# Patient Record
Sex: MALE | Race: WHITE | NOT HISPANIC OR LATINO | URBAN - METROPOLITAN AREA
[De-identification: names, ages, dates, MRNs, and addresses within clinical notes are randomized per-mention and may not be internally consistent; named-entity substitution may affect disease eponyms.]

---

## 2017-01-27 ENCOUNTER — OFFICE VISIT (OUTPATIENT)
Dept: INTERNAL MEDICINE | Facility: CLINIC | Age: 52
End: 2017-01-27

## 2017-01-27 ENCOUNTER — APPOINTMENT (OUTPATIENT)
Dept: LAB | Facility: HOSPITAL | Age: 52
End: 2017-01-27

## 2017-01-27 VITALS
TEMPERATURE: 97.7 F | HEART RATE: 72 BPM | DIASTOLIC BLOOD PRESSURE: 70 MMHG | OXYGEN SATURATION: 96 % | SYSTOLIC BLOOD PRESSURE: 114 MMHG | WEIGHT: 183 LBS | BODY MASS INDEX: 25.89 KG/M2 | RESPIRATION RATE: 16 BRPM

## 2017-01-27 DIAGNOSIS — K21.9 GASTROESOPHAGEAL REFLUX DISEASE WITHOUT ESOPHAGITIS: ICD-10-CM

## 2017-01-27 DIAGNOSIS — Z00.00 PREVENTATIVE HEALTH CARE: ICD-10-CM

## 2017-01-27 DIAGNOSIS — E34.9 HYPOTESTOSTERONEMIA: ICD-10-CM

## 2017-01-27 DIAGNOSIS — E55.9 VITAMIN D DEFICIENCY: ICD-10-CM

## 2017-01-27 DIAGNOSIS — I10 ESSENTIAL HYPERTENSION: ICD-10-CM

## 2017-01-27 DIAGNOSIS — E78.49 OTHER HYPERLIPIDEMIA: Primary | ICD-10-CM

## 2017-01-27 DIAGNOSIS — J44.9 CHRONIC OBSTRUCTIVE BRONCHITIS (HCC): ICD-10-CM

## 2017-01-27 DIAGNOSIS — E66.3 OVERWEIGHT: ICD-10-CM

## 2017-01-27 LAB
25(OH)D3 SERPL-MCNC: 34.7 NG/ML
ALBUMIN SERPL-MCNC: 4.6 G/DL (ref 3.2–4.8)
ALBUMIN/GLOB SERPL: 1.8 G/DL (ref 1.5–2.5)
ALP SERPL-CCNC: 63 U/L (ref 25–100)
ALT SERPL W P-5'-P-CCNC: 36 U/L (ref 7–40)
ANION GAP SERPL CALCULATED.3IONS-SCNC: -1 MMOL/L (ref 3–11)
ARTICHOKE IGE QN: 87 MG/DL (ref 0–130)
AST SERPL-CCNC: 25 U/L (ref 0–33)
BASOPHILS # BLD AUTO: 0.03 10*3/MM3 (ref 0–0.2)
BASOPHILS NFR BLD AUTO: 0.3 % (ref 0–1)
BILIRUB SERPL-MCNC: 0.7 MG/DL (ref 0.3–1.2)
BUN BLD-MCNC: 8 MG/DL (ref 9–23)
BUN/CREAT SERPL: 8.9 (ref 7–25)
CALCIUM SPEC-SCNC: 10.8 MG/DL (ref 8.7–10.4)
CHLORIDE SERPL-SCNC: 107 MMOL/L (ref 99–109)
CHOLEST SERPL-MCNC: 165 MG/DL (ref 0–200)
CO2 SERPL-SCNC: 34 MMOL/L (ref 20–31)
CREAT BLD-MCNC: 0.9 MG/DL (ref 0.6–1.3)
DEPRECATED RDW RBC AUTO: 42.6 FL (ref 37–54)
EOSINOPHIL # BLD AUTO: 0.47 10*3/MM3 (ref 0.1–0.3)
EOSINOPHIL NFR BLD AUTO: 4.3 % (ref 0–3)
ERYTHROCYTE [DISTWIDTH] IN BLOOD BY AUTOMATED COUNT: 12.1 % (ref 11.3–14.5)
GFR SERPL CREATININE-BSD FRML MDRD: 88 ML/MIN/1.73
GLOBULIN UR ELPH-MCNC: 2.5 GM/DL
GLUCOSE BLD-MCNC: 84 MG/DL (ref 70–100)
HCT VFR BLD AUTO: 43.1 % (ref 38.9–50.9)
HDLC SERPL-MCNC: 38 MG/DL (ref 40–60)
HGB BLD-MCNC: 15.7 G/DL (ref 13.1–17.5)
IMM GRANULOCYTES # BLD: 0.03 10*3/MM3 (ref 0–0.03)
IMM GRANULOCYTES NFR BLD: 0.3 % (ref 0–0.6)
LYMPHOCYTES # BLD AUTO: 3.64 10*3/MM3 (ref 0.6–4.8)
LYMPHOCYTES NFR BLD AUTO: 33 % (ref 24–44)
MCH RBC QN AUTO: 35.2 PG (ref 27–31)
MCHC RBC AUTO-ENTMCNC: 36.4 G/DL (ref 32–36)
MCV RBC AUTO: 96.6 FL (ref 80–99)
MONOCYTES # BLD AUTO: 0.77 10*3/MM3 (ref 0–1)
MONOCYTES NFR BLD AUTO: 7 % (ref 0–12)
NEUTROPHILS # BLD AUTO: 6.08 10*3/MM3 (ref 1.5–8.3)
NEUTROPHILS NFR BLD AUTO: 55.1 % (ref 41–71)
PLATELET # BLD AUTO: 280 10*3/MM3 (ref 150–450)
PMV BLD AUTO: 10.6 FL (ref 6–12)
POTASSIUM BLD-SCNC: 4.6 MMOL/L (ref 3.5–5.5)
PROT SERPL-MCNC: 7.1 G/DL (ref 5.7–8.2)
RBC # BLD AUTO: 4.46 10*6/MM3 (ref 4.2–5.76)
SODIUM BLD-SCNC: 140 MMOL/L (ref 132–146)
TRIGL SERPL-MCNC: 311 MG/DL (ref 0–150)
WBC NRBC COR # BLD: 11.02 10*3/MM3 (ref 3.5–10.8)

## 2017-01-27 PROCEDURE — 84403 ASSAY OF TOTAL TESTOSTERONE: CPT | Performed by: NURSE PRACTITIONER

## 2017-01-27 PROCEDURE — 80053 COMPREHEN METABOLIC PANEL: CPT | Performed by: NURSE PRACTITIONER

## 2017-01-27 PROCEDURE — 80061 LIPID PANEL: CPT | Performed by: NURSE PRACTITIONER

## 2017-01-27 PROCEDURE — 85025 COMPLETE CBC W/AUTO DIFF WBC: CPT | Performed by: NURSE PRACTITIONER

## 2017-01-27 PROCEDURE — 36415 COLL VENOUS BLD VENIPUNCTURE: CPT | Performed by: NURSE PRACTITIONER

## 2017-01-27 PROCEDURE — 99214 OFFICE O/P EST MOD 30 MIN: CPT | Performed by: NURSE PRACTITIONER

## 2017-01-27 PROCEDURE — 84402 ASSAY OF FREE TESTOSTERONE: CPT | Performed by: NURSE PRACTITIONER

## 2017-01-27 PROCEDURE — 82306 VITAMIN D 25 HYDROXY: CPT | Performed by: NURSE PRACTITIONER

## 2017-01-27 PROCEDURE — 90715 TDAP VACCINE 7 YRS/> IM: CPT | Performed by: NURSE PRACTITIONER

## 2017-01-27 PROCEDURE — 90471 IMMUNIZATION ADMIN: CPT | Performed by: NURSE PRACTITIONER

## 2017-01-27 RX ORDER — TOPIRAMATE 25 MG/1
25 TABLET ORAL 2 TIMES DAILY
Qty: 60 TABLET | Refills: 2
Start: 2017-01-27 | End: 2017-05-12

## 2017-01-27 NOTE — PATIENT INSTRUCTIONS
1. Resume Fish oil 1000 mg daily.    2. Stop Omeprazole. Continue Ranitidine 150 mg dily.     3. Switch Topiramate 25 mg to 1 tab before breakfast and 1 tab before supper.    4. Tdap vaccine given today.    5. Colonoscopy to be done this Spring.    6. Continue other meds.    7. Walk at least 30 minutes daily.    8. Annual fasting exam in 3 months.

## 2017-01-27 NOTE — MR AVS SNAPSHOT
Obie Marbella   1/27/2017 2:00 PM   Office Visit    Provider:  PAULO Jaramillo   Department:  [facility] INTERNAL MEDICINE   Dept Phone:  [phone]                Your Full Care Plan              Today's Medication Changes          These changes are accurate as of: 1/27/17  3:37 PM.  If you have any questions, ask your nurse or doctor.               New Medication(s)Ordered:     Tdap 5-2.5-18.5 LF-MCG/0.5 injection   Commonly known as:  BOOSTRIX   Inject 0.5 mL into the shoulder, thigh, or buttocks 1 (One) Time for 1 dose.         Medication(s)that have changed:     topiramate 25 MG tablet   Commonly known as:  TOPAMAX   Take 1 tablet by mouth 2 (Two) Times a Day. Take 1 tab before breakfast and one tab before supper   What changed:  additional instructions         Stop taking medication(s)listed here:     HYDROcodone-acetaminophen  MG per tablet   Commonly known as:  NORCO           omeprazole 20 MG capsule   Commonly known as:  priLOSEC                Where to Get Your Medications      These medications were sent to [facility] [address] [place] [address] [phone]  - [phone] [place] [address]     Phone:  [phone]     Tdap 5-2.5-18.5 LF-MCG/0.5 injection         Information about where to get these medications is not yet available     ! Ask your nurse or doctor about these medications     topiramate 25 MG tablet                  Your Updated Medication List          This list is accurate as of: 1/27/17  3:37 PM.  Always use your most recent med list.                albuterol 108 (90 BASE) MCG/ACT inhaler   Commonly known as:  PROAIR HFA   Inhale 1 puff Every 6 (Six) Hours As Needed for wheezing.       atorvastatin 40 MG tablet   Commonly known as:  LIPITOR   TAKE ONE TABLET BY MOUTH EVERY NIGHT       citalopram 40 MG tablet   Commonly known as:  CeleXA   TAKE ONE TABLET BY MOUTH ONCE  DAILY       doxazosin 2 MG tablet   Commonly known as:  CARDURA   TAKE ONE TABLET BY MOUTH ONCE DAILY       famotidine 20 MG tablet   Commonly known as:  PEPCID       fish oil 1000 MG capsule capsule   Take 1 capsule by mouth Daily With Breakfast.       gabapentin 600 MG tablet   Commonly known as:  NEURONTIN   TAKE ONE TABLET BY MOUTH IN THE EVENING AT 6PM AND ONE AT BEDTIME       lisinopril-hydrochlorothiazide 20-12.5 MG per tablet   Commonly known as:  PRINZIDE,ZESTORETIC   TAKE ONE-HALF TABLET BY MOUTH ONCE DAILY       loratadine 10 MG tablet   Commonly known as:  CLARITIN   Take 1 tablet by mouth Daily.       SUMAtriptan 100 MG tablet   Commonly known as:  IMITREX   Take 1 tablet by mouth 1 (One) Time As Needed for migraine for up to 1 dose.       Tdap 5-2.5-18.5 LF-MCG/0.5 injection   Commonly known as:  BOOSTRIX   Inject 0.5 mL into the shoulder, thigh, or buttocks 1 (One) Time for 1 dose.       topiramate 25 MG tablet   Commonly known as:  TOPAMAX   Take 1 tablet by mouth 2 (Two) Times a Day. Take 1 tab before breakfast and one tab before supper       TYLENOL 500 MG tablet   Generic drug:  acetaminophen       vitamin D3 5000 UNITS capsule capsule               We Performed the Following     CBC & Differential     Comprehensive Metabolic Panel     Lipid Panel     Testosterone, Free, Total     Vitamin D 25 Hydroxy       You Were Diagnosed With        Codes Comments    Other hyperlipidemia    -  Primary ICD-10-CM: E78.4  ICD-9-CM: 272.4     Essential hypertension     ICD-10-CM: I10  ICD-9-CM: 401.9     Chronic obstructive bronchitis     ICD-10-CM: J44.9  ICD-9-CM: 491.20     Gastroesophageal reflux disease without esophagitis     ICD-10-CM: K21.9  ICD-9-CM: 530.81     Vitamin D deficiency     ICD-10-CM: E55.9  ICD-9-CM: 268.9     Overweight     ICD-10-CM: E66.3  ICD-9-CM: 278.02     Hypotestosteronemia     ICD-10-CM: E29.1  ICD-9-CM: 257.2     Preventative health care     ICD-10-CM: Z00.00  ICD-9-CM: V70.0        Instructions    1. Resume Fish oil 1000 mg daily.    2. Stop Omeprazole. Continue Ranitidine 150 mg dily.     3. Switch Topiramate 25 mg to 1 tab before breakfast and 1 tab before supper.    4. Tdap vaccine given today.    5. Colonoscopy to be done this Spring.    6. Continue other meds.    7. Walk at least 30 minutes daily.    8. Annual fasting exam in 3 months.       Patient Instructions History      [employer] Signup     Our records indicate that you have an active [employer] account.    You can view your After Visit Summary by going to [employer] and logging in with your Social[employer]unch username and password.  If you don't have a [employer]Crunch username and password but a parent or guardian has access to your record, the parent or guardian should login with their own SocialCrunch username and password and access your record to view the After Visit Summary.    If you have questions, you can email [email or web address] or call [phone] to talk to our SocialCr[employer] staff.  Remember, SocialCrunch is NOT to be used for urgent needs.  For medical emergencies, dial 911.               Other Info from Your Visit           Allergies     Amitriptyline      Other reaction(s): Dry mouth    Bupropion  Nausea Only    Buspirone  Other (See Comments)    Iodine  Other (See Comments), Swelling    Risperidone  Other (See Comments)    Varenicline  Other (See Comments)      Reason for Visit     Hyperlipidemia           Vital Signs     Blood Pressure Pulse Temperature Respirations Weight Oxygen Saturation    114/70 (BP Location: Right arm, Patient Position: Sitting) 72 97.7 °F (36.5 °C) (Oral) 16 183 lb (83 kg) 96%    Body Mass Index Smoking Status                25.89 kg/m2 Current Every Day Smoker          Problems and Diagnoses Noted     Chronic obstructive bronchitis    Acid reflux disease    High cholesterol or triglycerides    High blood pressure    Hypotestosteronemia    Overweight    Preventative health care    Vitamin D  deficiency      No Longer an Issue     Dizziness

## 2017-01-27 NOTE — PROGRESS NOTES
Subjective   Obie Tyson is a 51 y.o. male.     History of Present Illness     1. Hyperlipidemia: Pt has several year history of elevated LDL. He is currently taking Atorvastatin 40 mg daily. LDL has been in 80's when he takes his statin consistently. He also has history of elevated TG and recurrent pancreatitis. His last TG level was 229 in October 2016. He has not been taking Fish oil.     2. Hypertension: Pt has no home BP readings. He is taking Lisinopril/HCTZ 20/12.5 mg daily and Doxazosin 2mg at bedtime mostly for weak urine stream.    3. Overweight: Pt has lost 3# in the past 3 months. He is taking Topiramate 25 mg 2 tabs every AM for headache prevention and to curb his appetite. This medication be helping somewhat, but pt continues to make poor food choices and is not interested in working with a dietitian. His current BMI is 26.     4. GERD: Pt reports no heartburn or reflux symptoms in almost 6 months. He is taking Ranitidine 150 mg daily, but does not think he has been taking Omeprazole.     5. Testosterone total level was 205 in October. This test was done per pt request. Plan to repeat total and free levels today. Pt c/o feeling tired quite a bit and is attributing his fatigue to low testosterone levels. Pt is sleeping about 4-6 hours/night, due to his work schedule.     The following portions of the patient's history were reviewed and updated as appropriate: allergies, current medications, past family history, past medical history, past social history, past surgical history and problem list.    Review of Systems   Constitutional: Positive for fatigue. Negative for fever.   HENT: Negative for congestion, ear pain, sinus pressure and sore throat.    Eyes: Negative for pain and itching.   Respiratory: Negative for cough, shortness of breath and wheezing.    Cardiovascular: Negative for chest pain, palpitations and leg swelling.   Gastrointestinal: Negative for abdominal pain and nausea.   Endocrine:  Negative for cold intolerance and heat intolerance.   Genitourinary: Negative for dysuria and hematuria.   Musculoskeletal: Positive for arthralgias (knee and elbow pain.). Negative for back pain.   Skin: Negative for rash and wound.   Allergic/Immunologic: Negative for environmental allergies and food allergies.   Neurological: Positive for headaches (chronic migraines). Negative for dizziness, seizures and syncope.   Hematological: Negative for adenopathy. Does not bruise/bleed easily.   Psychiatric/Behavioral: Negative for sleep disturbance. The patient is nervous/anxious.        Objective   Blood pressure 114/70, pulse 72, temperature 97.7 °F (36.5 °C), temperature source Oral, resp. rate 16, weight 183 lb (83 kg), SpO2 96 %.    Physical Exam   Constitutional: He is oriented to person, place, and time. He appears well-developed and well-nourished.   HENT:   Right Ear: Tympanic membrane and ear canal normal.   Left Ear: Tympanic membrane and ear canal normal.   Mouth/Throat: No posterior oropharyngeal edema or posterior oropharyngeal erythema.   Eyes: Conjunctivae and lids are normal.   Cardiovascular: Normal rate, regular rhythm and normal heart sounds.    No murmur heard.  No edema     Pulmonary/Chest: Effort normal. He has no wheezes. He has no rales.   Moderately decreased lung sounds throughout lung fields   Abdominal: Soft. Bowel sounds are normal. There is no tenderness.   Neurological: He is alert and oriented to person, place, and time.   Skin: Skin is warm and dry.   Psychiatric: He has a normal mood and affect. His behavior is normal. Thought content normal.   Vitals reviewed.    Procedures  Assessment/Plan   Obie was seen today for hyperlipidemia.    Diagnoses and all orders for this visit:    Other hyperlipidemia  -     Lipid Panel  -     Comprehensive Metabolic Panel    Essential hypertension    Chronic obstructive bronchitis  -     CBC & Differential  -     CBC Auto  Differential    Gastroesophageal reflux disease without esophagitis    Vitamin D deficiency    Overweight  -     Vitamin D 25 Hydroxy  -     topiramate (TOPAMAX) 25 MG tablet; Take 1 tablet by mouth 2 (Two) Times a Day. Take 1 tab before breakfast and one tab before supper    Hypotestosteronemia  -     Testosterone, Free, Total    Preventative health care  -     Discontinue: Tdap (BOOSTRIX) 5-2.5-18.5 LF-MCG/0.5 injection; Inject 0.5 mL into the shoulder, thigh, or buttocks 1 (One) Time for 1 dose.  -     Tdap Vaccine Greater Than or Equal To 8yo IM    1. Hyperlipidemia: Labs done today. Pt instructed to resume Fish oil daily. Plan to continue Atorvastatin 40 mg daily.    2. GERD: Pt has been asymptomatic for 6 months. Will stop Omeprazole today and continue Ranitidine 150 mg daily.     3. Overweight: Pt is mildly overweight with BMI of 26. He is to split his Topiramate dose and take one tab before breakfast and supper. Hopefully this dose will be more effective in decreasing his appetite throughout the day and continue to help ease his migraines. Goal weight in one year 170#.    4. Hypertension: Pt's BP is adequately controlled with his current meds.    5. Pt has mildly low testosterone level. Will repeat total and free levels today, then discuss possible treatment options if indicated.     6. Tdap vaccine given today.    7. Colonoscopy scheduled for this Spring.    8. Continue other meds.    Annual fasting exam due in 3 months.    Patient Instructions   1. Resume Fish oil 1000 mg daily.    2. Stop Omeprazole. Continue Ranitidine 150 mg dily.     3. Switch Topiramate 25 mg to 1 tab before breakfast and 1 tab before supper.    4. Tdap vaccine given today.    5. Colonoscopy to be done this Spring.    6. Continue other meds.    7. Walk at least 30 minutes daily.    8. Annual fasting exam in 3 months.          Electronically signed by PAULO Mukherjee 1/27/2017 4:11 PM

## 2017-01-30 ENCOUNTER — TELEPHONE (OUTPATIENT)
Dept: INTERNAL MEDICINE | Facility: CLINIC | Age: 52
End: 2017-01-30

## 2017-01-30 LAB
CONV COMMENT: ABNORMAL
TESTOST FREE SERPL-MCNC: 6.6 PG/ML (ref 7.2–24)
TESTOST SERPL-MCNC: 257 NG/DL (ref 348–1197)

## 2017-01-30 NOTE — TELEPHONE ENCOUNTER
Pt notified of labs: Vit D 34.7, FBG 84, LDL 87,  - watch diet, FBF WBC slightly elevated at 11.02 - track temp daily; may be due to recent cold or virus; testosterone levels: total 257, Free 6.6 - both borderline low. He was counseled that is fatigue or decreased exercise potential is problem may consider testosterone replacement.  Pt is to come to office tomorrow to discuss treatment options for low testosterone.

## 2017-01-31 ENCOUNTER — OFFICE VISIT (OUTPATIENT)
Dept: INTERNAL MEDICINE | Facility: CLINIC | Age: 52
End: 2017-01-31

## 2017-01-31 VITALS
BODY MASS INDEX: 26.03 KG/M2 | WEIGHT: 184 LBS | RESPIRATION RATE: 16 BRPM | DIASTOLIC BLOOD PRESSURE: 60 MMHG | OXYGEN SATURATION: 97 % | HEART RATE: 64 BPM | TEMPERATURE: 98.3 F | SYSTOLIC BLOOD PRESSURE: 114 MMHG

## 2017-01-31 DIAGNOSIS — R53.83 FATIGUE, UNSPECIFIED TYPE: ICD-10-CM

## 2017-01-31 DIAGNOSIS — E34.9 TESTOSTERONE DEFICIENCY: Primary | ICD-10-CM

## 2017-01-31 DIAGNOSIS — E34.9 HYPOTESTOSTERONEMIA: Primary | ICD-10-CM

## 2017-01-31 PROCEDURE — 99212 OFFICE O/P EST SF 10 MIN: CPT | Performed by: NURSE PRACTITIONER

## 2017-01-31 RX ORDER — TESTOSTERONE CYPIONATE 200 MG/ML
200 INJECTION, SOLUTION INTRAMUSCULAR
Qty: 10 ML | Refills: 0 | OUTPATIENT
Start: 2017-01-31 | End: 2017-08-24 | Stop reason: SDUPTHER

## 2017-01-31 NOTE — MR AVS SNAPSHOT
Obie Tyson   1/31/2017 1:30 PM   Office Visit    Provider:  PAULO Jaramillo   Department:  [facility] INTERNAL MEDICINE   Dept Phone:  [phone]                Your Full Care Plan              Your Updated Medication List          This list is accurate as of: 1/31/17  2:54 PM.  Always use your most recent med list.                albuterol 108 (90 BASE) MCG/ACT inhaler   Commonly known as:  PROAIR HFA   Inhale 1 puff Every 6 (Six) Hours As Needed for wheezing.       atorvastatin 40 MG tablet   Commonly known as:  LIPITOR   TAKE ONE TABLET BY MOUTH EVERY NIGHT       citalopram 40 MG tablet   Commonly known as:  CeleXA   TAKE ONE TABLET BY MOUTH ONCE DAILY       doxazosin 2 MG tablet   Commonly known as:  CARDURA   TAKE ONE TABLET BY MOUTH ONCE DAILY       famotidine 20 MG tablet   Commonly known as:  PEPCID       fish oil 1000 MG capsule capsule   Take 1 capsule by mouth Daily With Breakfast.       gabapentin 600 MG tablet   Commonly known as:  NEURONTIN   TAKE ONE TABLET BY MOUTH IN THE EVENING AT 6PM AND ONE AT BEDTIME       lisinopril-hydrochlorothiazide 20-12.5 MG per tablet   Commonly known as:  PRINZIDE,ZESTORETIC   TAKE ONE-HALF TABLET BY MOUTH ONCE DAILY       loratadine 10 MG tablet   Commonly known as:  CLARITIN   Take 1 tablet by mouth Daily.       SUMAtriptan 100 MG tablet   Commonly known as:  IMITREX   Take 1 tablet by mouth 1 (One) Time As Needed for migraine for up to 1 dose.       topiramate 25 MG tablet   Commonly known as:  TOPAMAX   Take 1 tablet by mouth 2 (Two) Times a Day. Take 1 tab before breakfast and one tab before supper       TYLENOL 500 MG tablet   Generic drug:  acetaminophen       vitamin D3 5000 UNITS capsule capsule               You Were Diagnosed With        Codes Comments    Hypotestosteronemia    -  Primary ICD-10-CM: E29.1  ICD-9-CM: 257.2       Instructions    1. Plan to start monthly Testosterone injections. Bring  Testosterone from pharmacy to office to begin injections when Rx filled.    2. Try to improve sleep pattern by staying awake when you arrive home and go to bed at 7:30 to sleep until time to rise for work.     3. Continue other meds.    4. Annual fasting f/u in May.       Patient Instructions History      [employer] Signup     Our records indicate that you have an active [facility] [employer] account.    You can view your After Visit Summary by going to [employer] and logging in with your e-[employer] username and password.  If you don't have a [employer] username and password but a parent or guardian has access to your record, the parent or guardian should login with their own e-Go aeroplanes username and password and access your record to view the After Visit Summary.    If you have questions, you can email [email or web address] or call [phone] to talk to our e-[employer] staff.  Remember, [employer]anes is NOT to be used for urgent needs.  For medical emergencies, dial 911.               Other Info from Your Visit           Your Appointments     May 05, 2017  2:00 PM EDT   Physical with PAULO Jaramillo   [facility] INTERNAL [facility]IC[facility] (--)    [address] [place] [address], [place]  [facility] [address] [phone]           Arrive 15 minutes prior to appointment.              Allergies     Amitriptyline      Other reaction(s): Dry mouth    Bupropion  Nausea Only    Buspirone  Other (See Comments)    Iodine  Other (See Comments), Swelling    Risperidone  Other (See Comments)    Varenicline  Other (See Comments)      Reason for Visit     Fatigue           Vital Signs     Blood Pressure Pulse Temperature Respirations Weight Oxygen Saturation    114/60 (BP Location: Right arm, Patient Position: Sitting) 64 98.3 °F (36.8 °C) (Oral) 16 184 lb (83.5 kg) 97%    Body Mass Index Smoking Status                26.03 kg/m2 Current Every Day Smoker          Problems and Diagnoses Noted      Hypotestosteronemia

## 2017-01-31 NOTE — PROGRESS NOTES
I personally interviewed the patient today concerning his testosterone deficiency.  He feels that he has more fatigue in the last year and would like to start testosterone shots.  He is also has erectile dysfunction last year that hopes to improve.  He has never tried Viagra.  I've counseled him on the routine replacement of testosterone and the assessment over the next few months.    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  He is in bed from 9:30 PM to 1:30 AM and then gets up for work.  He generally for 2 hours in the afternoon.  I've asked him to consider going to bed at 7:30 and get a total of 6 hours of sleep for better fatigue control.

## 2017-01-31 NOTE — PROGRESS NOTES
Mata Tyson is a 51 y.o. male.     History of Present Illness     Pt is here to discuss low testosterone level.  Total level was 205 in October 2016. Total was 257 and Free level was 6.6 on Jan. 27. He reports several year history of fatigue and low sexual drive. He has taken Citalopram 40 mg daily for depression and anxiety for several years. He has no history of elevated PSA; last PSA was 1.9 in March 2016. Pt's sleep cycle is broken. He arises at 2AM to get to work by 5AM; takes a nap for 2 hours in the afternoon, then goes to bed about 9PM.     The following portions of the patient's history were reviewed and updated as appropriate: allergies, current medications, past family history, past medical history, past social history, past surgical history and problem list.    Review of Systems   Constitutional: Positive for fatigue. Negative for fever.   HENT: Negative for congestion and sore throat.    Eyes: Negative for pain and itching.   Respiratory: Negative for cough and shortness of breath.    Cardiovascular: Negative for chest pain and palpitations.   Gastrointestinal: Negative for abdominal pain and nausea.   Endocrine: Negative for cold intolerance and heat intolerance.   Genitourinary: Negative for dysuria and hematuria.   Musculoskeletal: Negative for arthralgias and back pain.   Skin: Negative for rash and wound.   Allergic/Immunologic: Negative for environmental allergies and food allergies.   Neurological: Negative for dizziness and headaches.   Hematological: Negative for adenopathy. Does not bruise/bleed easily.   Psychiatric/Behavioral: Positive for sleep disturbance. The patient is nervous/anxious.        Objective   Blood pressure 114/60, pulse 64, temperature 98.3 °F (36.8 °C), temperature source Oral, resp. rate 16, weight 184 lb (83.5 kg), SpO2 97 %.    Physical Exam   Constitutional: He is oriented to person, place, and time. He appears well-developed and well-nourished.    Neurological: He is alert and oriented to person, place, and time.   Skin: Skin is warm and dry.   Psychiatric: His speech is normal and behavior is normal. His mood appears anxious (mildly).   Vitals reviewed.    Procedures  Assessment/Plan   Obie was seen today for fatigue.    Diagnoses and all orders for this visit:    Hypotestosteronemia    Fatigue, unspecified type        1. Hypotestosteronemia: Pt was counseled per Dr. Julian on benefits and risks of testosterone replacement therapy. He may benefit from monthly injections of testosterone - 200 mg/month. Will start these injections when Rx is able to be picked up. Pt will return to office monthly for injections.     2. Fatigue: Most likely multifactorial. Pt was counseled to work on sleep pattern, trying to stay awake until bedtime in the evening - may then retire about 7:30 PM to attempt to get at least 6.5-7 hours of straight sleep. Improved sleep pattern may help anxiety as well as overall fatigue.     Continue other meds.    Annual fasting exam in early May.    TGF present @ this OX.    Patient Instructions   1. Plan to start monthly Testosterone injections. Bring Testosterone from pharmacy to office to begin injections when Rx filled.    2. Try to improve sleep pattern by staying awake when you arrive home and go to bed at 7:30 to sleep until time to rise for work.     3. Continue other meds.    4. Annual fasting f/u in May.          Electronically signed by PAULO Mukherjee 1/31/2017 3:27 PM

## 2017-01-31 NOTE — PATIENT INSTRUCTIONS
1. Plan to start monthly Testosterone injections. Bring Testosterone from pharmacy to office to begin injections when Rx filled.    2. Try to improve sleep pattern by staying awake when you arrive home and go to bed at 7:30 to sleep until time to rise for work.     3. Continue other meds.    4. Annual fasting f/u in May.

## 2017-02-15 ENCOUNTER — TELEPHONE (OUTPATIENT)
Dept: INTERNAL MEDICINE | Facility: CLINIC | Age: 52
End: 2017-02-15

## 2017-03-13 RX ORDER — CITALOPRAM 40 MG/1
TABLET ORAL
Qty: 90 TABLET | Refills: 0 | Status: SHIPPED | OUTPATIENT
Start: 2017-03-13 | End: 2017-06-14 | Stop reason: SDUPTHER

## 2017-03-22 RX ORDER — CITALOPRAM 40 MG/1
TABLET ORAL
Qty: 90 TABLET | Refills: 0 | OUTPATIENT
Start: 2017-03-22

## 2017-04-03 ENCOUNTER — TELEPHONE (OUTPATIENT)
Dept: INTERNAL MEDICINE | Facility: CLINIC | Age: 52
End: 2017-04-03

## 2017-04-03 NOTE — TELEPHONE ENCOUNTER
----- Message from Pari Pa sent at 4/3/2017 11:15 AM EDT -----  Contact: MARTITA FARFAN- HIS CELEXA WAS DENIED AND NOBODY NOTIFIED PATIENT AS TO WHY. HE IS COMPLETELY OUT. [phone]      Msg left to notify pt a refill request we received on 3/21 was denied bc a refill was already sent to pharmacy on 3/13.

## 2017-04-03 NOTE — TELEPHONE ENCOUNTER
Pt was contacted re: Testosterone injections. He reports that he picked up his first dose on March 11 and his step-daughter Cate is giving them to him. He was advised that the agreement was for him to bring medication to office for monthly injections. Per TGF will agree to home injections if he calls every month on day of injection to let the office know when he got the injection. He is in agreement with this plan. Pt was asked about genetic testing request received from [facility]; which lab states pt requested on-line. Pt states he knows nothing about this testing and requests that this form be destroyed. Pt is upset that he is out of Celexa, which he requested 2 weeks ago. [facility] was contacted and pt has Rx available from refill placed on March 13, which they well get ready for him today. Pt was notified that Celexa Rx will be ready later today.

## 2017-04-13 ENCOUNTER — TELEPHONE (OUTPATIENT)
Dept: INTERNAL MEDICINE | Facility: CLINIC | Age: 52
End: 2017-04-13

## 2017-04-13 NOTE — TELEPHONE ENCOUNTER
----- Message from Jeremias Parrish sent at 4/13/2017 11:48 AM EDT -----  Regarding: TESTOSTERONE SHOT  CALLED TO TELL YOU THAT SADA GAVE HIM HIS TESTOSTERONE SHOT YESTERDAY.

## 2017-05-12 ENCOUNTER — OFFICE VISIT (OUTPATIENT)
Dept: INTERNAL MEDICINE | Facility: CLINIC | Age: 52
End: 2017-05-12

## 2017-05-12 ENCOUNTER — APPOINTMENT (OUTPATIENT)
Dept: LAB | Facility: HOSPITAL | Age: 52
End: 2017-05-12

## 2017-05-12 VITALS
BODY MASS INDEX: 24.64 KG/M2 | TEMPERATURE: 98.9 F | HEIGHT: 71 IN | WEIGHT: 176 LBS | RESPIRATION RATE: 16 BRPM | DIASTOLIC BLOOD PRESSURE: 66 MMHG | SYSTOLIC BLOOD PRESSURE: 130 MMHG | HEART RATE: 64 BPM | OXYGEN SATURATION: 99 %

## 2017-05-12 DIAGNOSIS — G43.009 MIGRAINE WITHOUT AURA AND WITHOUT STATUS MIGRAINOSUS, NOT INTRACTABLE: ICD-10-CM

## 2017-05-12 DIAGNOSIS — Z11.59 NEED FOR HEPATITIS C SCREENING TEST: ICD-10-CM

## 2017-05-12 DIAGNOSIS — E78.49 OTHER HYPERLIPIDEMIA: Primary | ICD-10-CM

## 2017-05-12 DIAGNOSIS — Z12.5 SCREENING FOR PROSTATE CANCER: ICD-10-CM

## 2017-05-12 DIAGNOSIS — I10 ESSENTIAL HYPERTENSION: ICD-10-CM

## 2017-05-12 DIAGNOSIS — F41.9 ANXIETY: ICD-10-CM

## 2017-05-12 DIAGNOSIS — E34.9 HYPOTESTOSTERONEMIA: ICD-10-CM

## 2017-05-12 DIAGNOSIS — E55.9 VITAMIN D DEFICIENCY: ICD-10-CM

## 2017-05-12 DIAGNOSIS — J44.9 CHRONIC OBSTRUCTIVE BRONCHITIS (HCC): ICD-10-CM

## 2017-05-12 LAB
25(OH)D3 SERPL-MCNC: 22.7 NG/ML
ALBUMIN SERPL-MCNC: 4.2 G/DL (ref 3.2–4.8)
ALBUMIN/GLOB SERPL: 2 G/DL (ref 1.5–2.5)
ALP SERPL-CCNC: 78 U/L (ref 25–100)
ALT SERPL W P-5'-P-CCNC: 27 U/L (ref 7–40)
ANION GAP SERPL CALCULATED.3IONS-SCNC: 4 MMOL/L (ref 3–11)
ARTICHOKE IGE QN: 84 MG/DL (ref 0–130)
AST SERPL-CCNC: 21 U/L (ref 0–33)
BACTERIA UR QL AUTO: NORMAL /HPF
BASOPHILS # BLD AUTO: 0.03 10*3/MM3 (ref 0–0.2)
BASOPHILS NFR BLD AUTO: 0.4 % (ref 0–1)
BILIRUB SERPL-MCNC: 0.2 MG/DL (ref 0.3–1.2)
BILIRUB UR QL STRIP: NEGATIVE
BUN BLD-MCNC: 10 MG/DL (ref 9–23)
BUN/CREAT SERPL: 12.5 (ref 7–25)
CALCIUM SPEC-SCNC: 10.2 MG/DL (ref 8.7–10.4)
CHLORIDE SERPL-SCNC: 108 MMOL/L (ref 99–109)
CHOLEST SERPL-MCNC: 151 MG/DL (ref 0–200)
CLARITY UR: CLEAR
CO2 SERPL-SCNC: 29 MMOL/L (ref 20–31)
COLOR UR: YELLOW
CREAT BLD-MCNC: 0.8 MG/DL (ref 0.6–1.3)
DEPRECATED RDW RBC AUTO: 40.2 FL (ref 37–54)
EOSINOPHIL # BLD AUTO: 0.36 10*3/MM3 (ref 0.1–0.3)
EOSINOPHIL NFR BLD AUTO: 4.7 % (ref 0–3)
ERYTHROCYTE [DISTWIDTH] IN BLOOD BY AUTOMATED COUNT: 11.6 % (ref 11.3–14.5)
GFR SERPL CREATININE-BSD FRML MDRD: 101 ML/MIN/1.73
GLOBULIN UR ELPH-MCNC: 2.1 GM/DL
GLUCOSE BLD-MCNC: 94 MG/DL (ref 70–100)
GLUCOSE UR STRIP-MCNC: NEGATIVE MG/DL
HCT VFR BLD AUTO: 40.8 % (ref 38.9–50.9)
HCV AB SER DONR QL: NORMAL
HDLC SERPL-MCNC: 39 MG/DL (ref 40–60)
HGB BLD-MCNC: 14.5 G/DL (ref 13.1–17.5)
HGB UR QL STRIP.AUTO: NEGATIVE
HYALINE CASTS UR QL AUTO: NORMAL /LPF
IMM GRANULOCYTES # BLD: 0.02 10*3/MM3 (ref 0–0.03)
IMM GRANULOCYTES NFR BLD: 0.3 % (ref 0–0.6)
KETONES UR QL STRIP: NEGATIVE
LEUKOCYTE ESTERASE UR QL STRIP.AUTO: NEGATIVE
LYMPHOCYTES # BLD AUTO: 2.86 10*3/MM3 (ref 0.6–4.8)
LYMPHOCYTES NFR BLD AUTO: 37 % (ref 24–44)
MCH RBC QN AUTO: 33.6 PG (ref 27–31)
MCHC RBC AUTO-ENTMCNC: 35.5 G/DL (ref 32–36)
MCV RBC AUTO: 94.7 FL (ref 80–99)
MONOCYTES # BLD AUTO: 0.52 10*3/MM3 (ref 0–1)
MONOCYTES NFR BLD AUTO: 6.7 % (ref 0–12)
NEUTROPHILS # BLD AUTO: 3.95 10*3/MM3 (ref 1.5–8.3)
NEUTROPHILS NFR BLD AUTO: 50.9 % (ref 41–71)
NITRITE UR QL STRIP: NEGATIVE
PH UR STRIP.AUTO: 6 [PH] (ref 5–8)
PLATELET # BLD AUTO: 246 10*3/MM3 (ref 150–450)
PMV BLD AUTO: 11 FL (ref 6–12)
POTASSIUM BLD-SCNC: 4.4 MMOL/L (ref 3.5–5.5)
PROT SERPL-MCNC: 6.3 G/DL (ref 5.7–8.2)
PROT UR QL STRIP: NEGATIVE
PSA SERPL-MCNC: 2.15 NG/ML (ref 0–4)
RBC # BLD AUTO: 4.31 10*6/MM3 (ref 4.2–5.76)
RBC # UR: NORMAL /HPF
REF LAB TEST METHOD: NORMAL
SODIUM BLD-SCNC: 141 MMOL/L (ref 132–146)
SP GR UR STRIP: 1.01 (ref 1–1.03)
SQUAMOUS #/AREA URNS HPF: NORMAL /HPF
TESTOST SERPL-MCNC: 225.72 NG/DL (ref 86.98–780.1)
TRIGL SERPL-MCNC: 242 MG/DL (ref 0–150)
UROBILINOGEN UR QL STRIP: NORMAL
WBC NRBC COR # BLD: 7.74 10*3/MM3 (ref 3.5–10.8)
WBC UR QL AUTO: NORMAL /HPF

## 2017-05-12 PROCEDURE — 93000 ELECTROCARDIOGRAM COMPLETE: CPT | Performed by: INTERNAL MEDICINE

## 2017-05-12 PROCEDURE — 36415 COLL VENOUS BLD VENIPUNCTURE: CPT | Performed by: NURSE PRACTITIONER

## 2017-05-12 PROCEDURE — 99406 BEHAV CHNG SMOKING 3-10 MIN: CPT | Performed by: NURSE PRACTITIONER

## 2017-05-12 PROCEDURE — 84403 ASSAY OF TOTAL TESTOSTERONE: CPT | Performed by: NURSE PRACTITIONER

## 2017-05-12 PROCEDURE — 80061 LIPID PANEL: CPT | Performed by: NURSE PRACTITIONER

## 2017-05-12 PROCEDURE — 99215 OFFICE O/P EST HI 40 MIN: CPT | Performed by: NURSE PRACTITIONER

## 2017-05-12 PROCEDURE — 86803 HEPATITIS C AB TEST: CPT | Performed by: NURSE PRACTITIONER

## 2017-05-12 PROCEDURE — 84153 ASSAY OF PSA TOTAL: CPT | Performed by: NURSE PRACTITIONER

## 2017-05-12 PROCEDURE — 81001 URINALYSIS AUTO W/SCOPE: CPT | Performed by: NURSE PRACTITIONER

## 2017-05-12 PROCEDURE — 85025 COMPLETE CBC W/AUTO DIFF WBC: CPT | Performed by: NURSE PRACTITIONER

## 2017-05-12 PROCEDURE — 80053 COMPREHEN METABOLIC PANEL: CPT | Performed by: NURSE PRACTITIONER

## 2017-05-12 PROCEDURE — 82306 VITAMIN D 25 HYDROXY: CPT | Performed by: NURSE PRACTITIONER

## 2017-05-12 RX ORDER — RISPERIDONE 0.25 MG/1
0.25 TABLET ORAL DAILY
Qty: 30 TABLET | Refills: 0 | Status: SHIPPED | OUTPATIENT
Start: 2017-05-12 | End: 2018-01-11

## 2017-05-12 RX ORDER — ACETAMINOPHEN, ASPIRIN AND CAFFEINE 250; 250; 65 MG/1; MG/1; MG/1
1 TABLET, FILM COATED ORAL 2 TIMES DAILY PRN
Refills: 0
Start: 2017-05-12 | End: 2017-09-07

## 2017-05-15 ENCOUNTER — TELEPHONE (OUTPATIENT)
Dept: INTERNAL MEDICINE | Facility: CLINIC | Age: 52
End: 2017-05-15

## 2017-05-15 DIAGNOSIS — E55.9 VITAMIN D DEFICIENCY: Primary | ICD-10-CM

## 2017-05-15 RX ORDER — CHOLECALCIFEROL (VITAMIN D3) 125 MCG
1 CAPSULE ORAL DAILY
Qty: 30 TABLET
Start: 2017-05-15 | End: 2021-11-15

## 2017-06-14 RX ORDER — DOXAZOSIN 2 MG/1
TABLET ORAL
Qty: 90 TABLET | Refills: 1 | Status: SHIPPED | OUTPATIENT
Start: 2017-06-14 | End: 2021-11-15

## 2017-06-14 RX ORDER — ATORVASTATIN CALCIUM 40 MG/1
TABLET, FILM COATED ORAL
Qty: 90 TABLET | Refills: 1 | Status: SHIPPED | OUTPATIENT
Start: 2017-06-14 | End: 2017-12-14 | Stop reason: SDUPTHER

## 2017-06-14 RX ORDER — LISINOPRIL AND HYDROCHLOROTHIAZIDE 20; 12.5 MG/1; MG/1
TABLET ORAL
Qty: 45 TABLET | Refills: 1 | Status: SHIPPED | OUTPATIENT
Start: 2017-06-14 | End: 2018-01-11 | Stop reason: SDUPTHER

## 2017-06-14 RX ORDER — CITALOPRAM 40 MG/1
TABLET ORAL
Qty: 90 TABLET | Refills: 1 | Status: SHIPPED | OUTPATIENT
Start: 2017-06-14 | End: 2017-12-14 | Stop reason: SDUPTHER

## 2017-08-22 RX ORDER — TESTOSTERONE CYPIONATE 200 MG/ML
INJECTION, SOLUTION INTRAMUSCULAR
Refills: 9 | OUTPATIENT
Start: 2017-08-22

## 2017-08-24 RX ORDER — TESTOSTERONE CYPIONATE 200 MG/ML
200 INJECTION, SOLUTION INTRAMUSCULAR
Qty: 3 ML | Refills: 0 | OUTPATIENT
Start: 2017-08-24 | End: 2017-11-28 | Stop reason: SDUPTHER

## 2017-09-07 ENCOUNTER — OFFICE VISIT (OUTPATIENT)
Dept: INTERNAL MEDICINE | Facility: CLINIC | Age: 52
End: 2017-09-07

## 2017-09-07 ENCOUNTER — APPOINTMENT (OUTPATIENT)
Dept: LAB | Facility: HOSPITAL | Age: 52
End: 2017-09-07

## 2017-09-07 VITALS
HEART RATE: 72 BPM | OXYGEN SATURATION: 97 % | TEMPERATURE: 99.4 F | RESPIRATION RATE: 16 BRPM | WEIGHT: 173 LBS | SYSTOLIC BLOOD PRESSURE: 114 MMHG | BODY MASS INDEX: 24.13 KG/M2 | DIASTOLIC BLOOD PRESSURE: 60 MMHG

## 2017-09-07 DIAGNOSIS — K21.9 GASTROESOPHAGEAL REFLUX DISEASE WITHOUT ESOPHAGITIS: ICD-10-CM

## 2017-09-07 DIAGNOSIS — J44.9 CHRONIC OBSTRUCTIVE BRONCHITIS (HCC): ICD-10-CM

## 2017-09-07 DIAGNOSIS — E55.9 VITAMIN D DEFICIENCY: ICD-10-CM

## 2017-09-07 DIAGNOSIS — E34.9 HYPOTESTOSTERONEMIA: ICD-10-CM

## 2017-09-07 DIAGNOSIS — F41.9 ANXIETY: ICD-10-CM

## 2017-09-07 DIAGNOSIS — I10 ESSENTIAL HYPERTENSION: ICD-10-CM

## 2017-09-07 DIAGNOSIS — E78.49 OTHER HYPERLIPIDEMIA: Primary | ICD-10-CM

## 2017-09-07 PROBLEM — Z11.59 NEED FOR HEPATITIS C SCREENING TEST: Status: RESOLVED | Noted: 2017-05-12 | Resolved: 2017-09-07

## 2017-09-07 LAB
ALBUMIN SERPL-MCNC: 4.4 G/DL (ref 3.2–4.8)
ALBUMIN/GLOB SERPL: 2 G/DL (ref 1.5–2.5)
ALP SERPL-CCNC: 83 U/L (ref 25–100)
ALT SERPL W P-5'-P-CCNC: 24 U/L (ref 7–40)
ANION GAP SERPL CALCULATED.3IONS-SCNC: 4 MMOL/L (ref 3–11)
ARTICHOKE IGE QN: 77 MG/DL (ref 0–130)
AST SERPL-CCNC: 19 U/L (ref 0–33)
BILIRUB SERPL-MCNC: 0.3 MG/DL (ref 0.3–1.2)
BUN BLD-MCNC: 8 MG/DL (ref 9–23)
BUN/CREAT SERPL: 10 (ref 7–25)
CALCIUM SPEC-SCNC: 10 MG/DL (ref 8.7–10.4)
CHLORIDE SERPL-SCNC: 110 MMOL/L (ref 99–109)
CHOLEST SERPL-MCNC: 146 MG/DL (ref 0–200)
CO2 SERPL-SCNC: 26 MMOL/L (ref 20–31)
CREAT BLD-MCNC: 0.8 MG/DL (ref 0.6–1.3)
GFR SERPL CREATININE-BSD FRML MDRD: 101 ML/MIN/1.73
GLOBULIN UR ELPH-MCNC: 2.2 GM/DL
GLUCOSE BLD-MCNC: 93 MG/DL (ref 70–100)
HDLC SERPL-MCNC: 37 MG/DL (ref 40–60)
POTASSIUM BLD-SCNC: 4.1 MMOL/L (ref 3.5–5.5)
PROT SERPL-MCNC: 6.6 G/DL (ref 5.7–8.2)
SODIUM BLD-SCNC: 140 MMOL/L (ref 132–146)
TESTOST SERPL-MCNC: 374.64 NG/DL (ref 86.98–780.1)
TRIGL SERPL-MCNC: 257 MG/DL (ref 0–150)

## 2017-09-07 PROCEDURE — 80061 LIPID PANEL: CPT | Performed by: INTERNAL MEDICINE

## 2017-09-07 PROCEDURE — 99213 OFFICE O/P EST LOW 20 MIN: CPT | Performed by: INTERNAL MEDICINE

## 2017-09-07 PROCEDURE — 84403 ASSAY OF TOTAL TESTOSTERONE: CPT | Performed by: INTERNAL MEDICINE

## 2017-09-07 PROCEDURE — 80053 COMPREHEN METABOLIC PANEL: CPT | Performed by: INTERNAL MEDICINE

## 2017-09-07 PROCEDURE — 36415 COLL VENOUS BLD VENIPUNCTURE: CPT | Performed by: INTERNAL MEDICINE

## 2017-09-07 NOTE — PROGRESS NOTES
Mata Tyson is a 52 y.o. male.     History of Present Illness     The patient continues to moderate manual labor at age 52.  He feels his back fatigue late in the day and he's had more right hip pain this summer.  He requires to gabapentin in the evening to relieve his back discomfort and sleep adequately.  He is not using analgesic pain medication except for Tylenol.  He has had multiple joint aches over the last few years based on the physical activity the predominate's for several weeks.    The following portions of the patient's history were reviewed and updated as appropriate: allergies, current medications, past family history, past medical history, past social history, past surgical history and problem list.    Review of Systems   Constitutional: Negative for appetite change and fatigue.   Respiratory: Positive for shortness of breath. Negative for wheezing.    Cardiovascular: Negative for chest pain and palpitations.   Gastrointestinal: Negative for abdominal distention and nausea.   Neurological: Positive for headaches. Negative for dizziness and light-headedness.       Objective   Blood pressure 114/60, pulse 72, temperature 99.4 °F (37.4 °C), temperature source Oral, resp. rate 16, weight 173 lb (78.5 kg), SpO2 97 %.    Physical Exam   Constitutional: He is oriented to person, place, and time. He appears well-developed and well-nourished. No distress.   Cardiovascular: Normal rate, regular rhythm and normal heart sounds.    Pulmonary/Chest: Effort normal. He has no wheezes. He has no rales.   Sounds generally tight and depressed   Musculoskeletal: Normal range of motion. He exhibits no edema.   I'll paralumbar tightness tenderness.  Hips full flexion extension rotation with minimal tenderness.   Neurological: He is alert and oriented to person, place, and time. He exhibits normal muscle tone. Coordination normal.   Psychiatric: He has a normal mood and affect. His behavior is normal.  Judgment and thought content normal.   Nursing note and vitals reviewed.    Procedures  Assessment/Plan   Obie was seen today for hip pain.    Diagnoses and all orders for this visit:    Other hyperlipidemia  -     Comprehensive Metabolic Panel  -     Lipid Panel    Essential hypertension    Chronic obstructive bronchitis    Gastroesophageal reflux disease without esophagitis    Anxiety    Hypotestosteronemia  -     Testosterone    Vitamin D deficiency    The patient has lumbar fatigue and spondylosis with referred pain to his right hip.  I've cautioned her to be careful about excessive standing and manual labor.    The patient has severe chronic anxiety and is doing well on citalopram.  He still has episodes of concerning about talking to himself too much.  He has failed risperidone this summer which caused excess of hangover.  He has failed multiple other medications.  Psychological counseling may be his best option.    The patient has moderate COPD from chronic tobacco use.  He has excessive tightness and points of dyspnea on exertion.  I've offered him a short course of Spiriva to test its effectiveness.    The patient is done well with testosterone replacement now for several years.  He wishes to go up on his dose so he feels stronger and more capable.  I suspect that higher doses of testosterone will only lead to side effects.    Patient Instructions   1.  Use Spiriva 1 whiff daily - to improve breathing.    2.  Do back exercises 5 minutes every morning - build back strength.    3.  Stop all tobacco - use electronic cigarette as needed.    4.  The nurse will call with test results.    5.  Return visit January - fasting checkup and preventive exam.    6.  Testosterone level adequate at 374  - May continue current testosterone replacement.    7.  LDL cholesterol excellent at 77.  Continue atorvastatin 40 mg.    8.  Chemistry panel is acceptable requires no change in treatment.    Electronically signed Earl  TEMI Julian M.D.9/9/2017 2:28 PM

## 2017-09-07 NOTE — PATIENT INSTRUCTIONS
1.  Use Spiriva 1 whiff daily - to improve breathing.    2.  Do back exercises 5 minutes every morning - build back strength.    3.  Stop all tobacco - use electronic cigarette as needed.    4.  The nurse will call with test results.    5.  Return visit January - fasting checkup and preventive exam.

## 2017-09-09 PROBLEM — M47.816 LUMBAR SPONDYLOSIS: Status: ACTIVE | Noted: 2017-09-09

## 2017-09-12 ENCOUNTER — TELEPHONE (OUTPATIENT)
Dept: INTERNAL MEDICINE | Facility: CLINIC | Age: 52
End: 2017-09-12

## 2017-09-12 NOTE — TELEPHONE ENCOUNTER
Called pt re: recent labs.  Per TGF -  Testosterone level adequate at 374  - May continue current testosterone replacement.  LDL cholesterol excellent at 77.  Continue atorvastatin 40 mg.  Chemistry panel is acceptable requires no change in treatment.  Pt verb understanding.

## 2017-09-26 ENCOUNTER — TELEPHONE (OUTPATIENT)
Dept: INTERNAL MEDICINE | Facility: CLINIC | Age: 52
End: 2017-09-26

## 2017-09-26 NOTE — TELEPHONE ENCOUNTER
----- Message from Pari Pa sent at 9/26/2017  9:59 AM EDT -----  Contact: MARTITA  PATIENT RIGHT HIP STILL HURTING. HE SPOKE TO TGF ABOUT THIS AT LAST VISIT AND TGF RECOMMENDED EXERCISES. PATIENT HAS BEEN WORKING ON THOSE BUT SEES NO IMPROVEMENT. HE WANTS A SCRIPT FOR MUSCLE RELAXER CALLED INTO [facility] [place]      Pt said he takes gabapentin 600 mg qevening & at bedtime and ibuprofen & tylenol but not helping.

## 2017-09-26 NOTE — TELEPHONE ENCOUNTER
Msg left for pt - per TGF can not order anything stronger over the phone, would need ox with hip xray upon arrival.

## 2017-09-27 ENCOUNTER — TELEPHONE (OUTPATIENT)
Dept: INTERNAL MEDICINE | Facility: CLINIC | Age: 52
End: 2017-09-27

## 2017-09-27 RX ORDER — GABAPENTIN 600 MG/1
TABLET ORAL
Qty: 90 TABLET | Refills: 0
Start: 2017-09-27 | End: 2017-12-29 | Stop reason: SDUPTHER

## 2017-09-27 RX ORDER — TRAMADOL HYDROCHLORIDE 50 MG/1
50 TABLET ORAL 2 TIMES DAILY PRN
Qty: 10 TABLET | Refills: 0 | OUTPATIENT
Start: 2017-09-27 | End: 2017-12-01

## 2017-09-27 RX ORDER — SENNOSIDES 8.6 MG
650 CAPSULE ORAL 2 TIMES DAILY
Start: 2017-09-27

## 2017-09-27 NOTE — TELEPHONE ENCOUNTER
Per TGF, I called pt re rt hip pain.  Notified will order tramadol bid prn #10. He needs to get on tylenol 650mg bid. Take additional gabapentin 300mg bid (am & pm) along with usual 600mg qevening & at bedtime. Pt to call next wk w/status. OX in 1 mo. Pt verb understanding.

## 2017-11-29 RX ORDER — TESTOSTERONE CYPIONATE 200 MG/ML
INJECTION, SOLUTION INTRAMUSCULAR
Qty: 3 ML | Refills: 0 | OUTPATIENT
Start: 2017-11-29 | End: 2018-01-11 | Stop reason: SDUPTHER

## 2017-12-01 ENCOUNTER — OFFICE VISIT (OUTPATIENT)
Dept: INTERNAL MEDICINE | Facility: CLINIC | Age: 52
End: 2017-12-01

## 2017-12-01 VITALS
DIASTOLIC BLOOD PRESSURE: 60 MMHG | SYSTOLIC BLOOD PRESSURE: 140 MMHG | TEMPERATURE: 99.4 F | BODY MASS INDEX: 23.99 KG/M2 | WEIGHT: 172 LBS | OXYGEN SATURATION: 96 % | RESPIRATION RATE: 16 BRPM | HEART RATE: 92 BPM

## 2017-12-01 DIAGNOSIS — F33.41 RECURRENT MAJOR DEPRESSIVE DISORDER, IN PARTIAL REMISSION (HCC): ICD-10-CM

## 2017-12-01 DIAGNOSIS — G25.81 RESTLESS LEGS SYNDROME: ICD-10-CM

## 2017-12-01 DIAGNOSIS — F41.9 ANXIETY: Primary | ICD-10-CM

## 2017-12-01 PROCEDURE — 99213 OFFICE O/P EST LOW 20 MIN: CPT | Performed by: INTERNAL MEDICINE

## 2017-12-01 RX ORDER — RISPERIDONE 0.5 MG/1
0.5 TABLET ORAL NIGHTLY
Qty: 30 TABLET | Refills: 5 | Status: SHIPPED | OUTPATIENT
Start: 2017-12-01 | End: 2018-01-11

## 2017-12-01 RX ORDER — ALPRAZOLAM 0.5 MG/1
0.5 TABLET ORAL DAILY PRN
Qty: 5 TABLET | Refills: 0 | OUTPATIENT
Start: 2017-12-01 | End: 2017-12-08 | Stop reason: SDUPTHER

## 2017-12-01 NOTE — PROGRESS NOTES
Mata Tyson is a 52 y.o. male.     History of Present Illness     The patient has many years of moderate chronic anxiety and clinical depression.  He stresses increased so much in the last year that he noticed he was talking to himself to relieve stress.  He has had no hallucinations or delusions.  He is been on citalopram in recent years and had been on desipramine up to the last year.  He works a stressful job as a  and generally only ECG from 9 PM to 2 AM because of his work schedule.  He has not been able to go to work earlier.  He has noticed a new severe anxiety with his wife and is considering a marital separation.    The following portions of the patient's history were reviewed and updated as appropriate: allergies, current medications, past family history, past medical history, past social history, past surgical history and problem list.    Review of Systems   Constitutional: Negative for appetite change and fatigue.   HENT: Negative for ear pain and sore throat.    Respiratory: Negative for cough and shortness of breath.    Cardiovascular: Negative for chest pain and palpitations.   Gastrointestinal: Negative for abdominal pain and nausea.   Musculoskeletal: Negative for arthralgias and back pain.   Neurological: Positive for headaches. Negative for dizziness.   Psychiatric/Behavioral: Positive for agitation, dysphoric mood and sleep disturbance. Negative for confusion and self-injury. The patient is nervous/anxious.        Objective   Blood pressure 140/60, pulse 92, temperature 99.4 °F (37.4 °C), temperature source Oral, resp. rate 16, weight 172 lb (78 kg), SpO2 96 %.    Physical Exam   Constitutional: He is oriented to person, place, and time. He appears well-developed and well-nourished. No distress.   Musculoskeletal: Normal range of motion. He exhibits no edema or tenderness.   Neurological: He is alert and oriented to person, place, and time. He exhibits normal muscle  tone. Coordination normal.   Psychiatric: His behavior is normal. Judgment and thought content normal.   Jacobo mildly agitated   Nursing note and vitals reviewed.    Procedures  Assessment/Plan   Obie was seen today for anxiety.    Diagnoses and all orders for this visit:    Anxiety  -     Ambulatory Referral to Psychology    Recurrent major depressive disorder, in partial remission  -     Ambulatory Referral to Psychology    Restless legs syndrome    Other orders  -     risperiDONE (risperDAL) 0.5 MG tablet; Take 1 tablet by mouth Every Night. At 8 PM  -     ALPRAZolam (XANAX) 0.5 MG tablet; Take 1 tablet by mouth Daily As Needed for Anxiety.    The patient seems to have significant insight in his degree of anxiety and his need to control his anger.  I've asked him to increase his risperidone at this time and to seek psychological counseling.  In fact his anxiety, depression, and other behavior would likely benefit from psychiatric evaluation.    The patient has recurring severe depression.  He may benefit from changing his medication perhaps to Cymbalta or adding a second agent such as imipramine.     The patient's had a significant restless leg syndrome.  He is receiving significant benefit from gabapentin.    Patient Instructions   1.  Change risperidone 0.25 mg - at 3:00 in the afternoon.    2.  Start risperidone 0.5 mg - at 8 PM in the evening.    3.  Counseling sessions with clinical psychologist - to improve handling stress.    4.  Try to increase sleep - from 5 hours nightly to 6 hours - nightly.    5.  Return visit January 11 - fasting checkup.    6.  Consider referral to psychiatrist for second opinion with medications.    Electronically signed Earl Julian M.D.12/3/2017 5:54 PM

## 2017-12-01 NOTE — PATIENT INSTRUCTIONS
1.  Change risperidone 0.25 mg - at 3:00 in the afternoon.    2.  Start risperidone 0.5 mg - at 8 PM in the evening.    3.  Counseling sessions with clinical psychologist - to improve handling stress.    4.  Try to increase sleep - from 5 hours nightly to 6 hours - nightly.    5.  Return visit January 11 - fasting checkup.

## 2017-12-08 ENCOUNTER — TELEPHONE (OUTPATIENT)
Dept: INTERNAL MEDICINE | Facility: CLINIC | Age: 52
End: 2017-12-08

## 2017-12-08 RX ORDER — SILDENAFIL CITRATE 20 MG/1
TABLET ORAL
Qty: 30 TABLET | Refills: 0 | OUTPATIENT
Start: 2017-12-08 | End: 2021-11-15

## 2017-12-08 RX ORDER — ALPRAZOLAM 0.5 MG/1
0.5 TABLET ORAL DAILY PRN
Qty: 30 TABLET | Refills: 0 | OUTPATIENT
Start: 2017-12-08 | End: 2018-01-11

## 2017-12-08 NOTE — TELEPHONE ENCOUNTER
----- Message from Pari Pa sent at 12/8/2017 10:14 AM EST -----  Contact: MARTITA [phone]  TOLD TO CALL WITH STATUS UPDATE, ALPRAZOLAM IS HELPING. HE WOULD LIKE A RENEWAL OF ALPRAZOLAM ALONG WITH A SCRIPT FOR ALEXIA SENT TO [facility] [place]

## 2017-12-08 NOTE — TELEPHONE ENCOUNTER
Pt requesting daily cialis as he's seen advertised on tv.   Per TGF, pt is on cardura, and taking cialis daily with it can cause hypotension. Can order prn sildenafil instead, which is cheaper than cialis. Pt agreeable to prn sildenafil. RX called in to [facility] per TGF.   Pt instrxed to monitor bp, and hold cardura the day before using sildenafil d/t hypotensive risk if taken together. Pt verb understanding.

## 2017-12-14 RX ORDER — ATORVASTATIN CALCIUM 40 MG/1
TABLET, FILM COATED ORAL
Qty: 90 TABLET | Refills: 1 | Status: SHIPPED | OUTPATIENT
Start: 2017-12-14 | End: 2018-04-02 | Stop reason: SDUPTHER

## 2017-12-14 RX ORDER — CITALOPRAM 40 MG/1
TABLET ORAL
Qty: 90 TABLET | Refills: 1 | Status: SHIPPED | OUTPATIENT
Start: 2017-12-14 | End: 2018-03-29

## 2017-12-29 ENCOUNTER — TELEPHONE (OUTPATIENT)
Dept: INTERNAL MEDICINE | Facility: CLINIC | Age: 52
End: 2017-12-29

## 2017-12-29 RX ORDER — GABAPENTIN 600 MG/1
600 TABLET ORAL
Qty: 180 TABLET | Refills: 1 | OUTPATIENT
Start: 2017-12-29 | End: 2021-11-15

## 2018-01-08 ENCOUNTER — OFFICE VISIT (OUTPATIENT)
Dept: RETAIL CLINIC | Facility: CLINIC | Age: 53
End: 2018-01-08

## 2018-01-08 VITALS
HEART RATE: 70 BPM | DIASTOLIC BLOOD PRESSURE: 72 MMHG | OXYGEN SATURATION: 96 % | BODY MASS INDEX: 32.12 KG/M2 | TEMPERATURE: 97.8 F | HEIGHT: 60 IN | RESPIRATION RATE: 16 BRPM | WEIGHT: 163.6 LBS | SYSTOLIC BLOOD PRESSURE: 146 MMHG

## 2018-01-08 DIAGNOSIS — J01.40 ACUTE NON-RECURRENT PANSINUSITIS: ICD-10-CM

## 2018-01-08 DIAGNOSIS — R68.89 FLU-LIKE SYMPTOMS: Primary | ICD-10-CM

## 2018-01-08 LAB
EXPIRATION DATE: NORMAL
FLUAV AG NPH QL: NEGATIVE
FLUBV AG NPH QL: NEGATIVE
INTERNAL CONTROL: NORMAL
Lab: NORMAL

## 2018-01-08 PROCEDURE — 99213 OFFICE O/P EST LOW 20 MIN: CPT | Performed by: NURSE PRACTITIONER

## 2018-01-08 PROCEDURE — 87804 INFLUENZA ASSAY W/OPTIC: CPT | Performed by: NURSE PRACTITIONER

## 2018-01-08 RX ORDER — BROMPHENIRAMINE MALEATE, PSEUDOEPHEDRINE HYDROCHLORIDE, AND DEXTROMETHORPHAN HYDROBROMIDE 2; 30; 10 MG/5ML; MG/5ML; MG/5ML
5 SYRUP ORAL 4 TIMES DAILY PRN
Qty: 240 ML | Refills: 0 | Status: SHIPPED | OUTPATIENT
Start: 2018-01-08 | End: 2018-01-13

## 2018-01-08 RX ORDER — DOXYCYCLINE HYCLATE 100 MG/1
100 CAPSULE ORAL 2 TIMES DAILY
Qty: 20 CAPSULE | Refills: 0 | Status: SHIPPED | OUTPATIENT
Start: 2018-01-08 | End: 2018-01-15

## 2018-01-08 RX ORDER — FLUTICASONE PROPIONATE 50 MCG
2 SPRAY, SUSPENSION (ML) NASAL NIGHTLY
Qty: 1 BOTTLE | Refills: 0 | Status: SHIPPED | OUTPATIENT
Start: 2018-01-08 | End: 2018-02-07

## 2018-01-08 NOTE — PROGRESS NOTES
Mata Tyson is a 52 y.o. male.     Flu Symptoms   This is a new problem. The current episode started in the past 7 days (5 days). The problem occurs constantly. The problem has been gradually worsening. Associated symptoms include arthralgias, chills, congestion (green ), coughing, diaphoresis, fatigue, a fever (has felt feverish but did not take temp at home), headaches, myalgias, swollen glands and weakness. Pertinent negatives include no abdominal pain (diarrhea x1), anorexia, change in bowel habit, chest pain, joint swelling, nausea, neck pain, numbness, rash, sore throat, urinary symptoms, vertigo, visual change or vomiting. Nothing aggravates the symptoms. Treatments tried: nyquil and day quil. The treatment provided no relief.        Current Outpatient Prescriptions on File Prior to Visit   Medication Sig Dispense Refill   • acetaminophen (TYLENOL ARTHRITIS PAIN) 650 MG 8 hr tablet Take 1 tablet by mouth 2 (Two) Times a Day.     • albuterol (PROAIR HFA) 108 (90 BASE) MCG/ACT inhaler Inhale 1 puff Every 6 (Six) Hours As Needed for wheezing. 1 inhaler 1   • ALPRAZolam (XANAX) 0.5 MG tablet Take 1 tablet by mouth Daily As Needed for Anxiety. 30 tablet 0   • atorvastatin (LIPITOR) 40 MG tablet TAKE ONE TABLET BY MOUTH IN THE EVENING 90 tablet 1   • Cholecalciferol (VITAMIN D3) 2000 UNITS tablet Take 1 tablet by mouth Daily. 30 tablet    • citalopram (CeleXA) 40 MG tablet TAKE ONE TABLET BY MOUTH ONCE DAILY 90 tablet 1   • doxazosin (CARDURA) 2 MG tablet TAKE ONE TABLET BY MOUTH ONCE DAILY 90 tablet 1   • famotidine (PEPCID) 20 MG tablet Take  by mouth.     • gabapentin (NEURONTIN) 600 MG tablet Take 1 tablet by mouth 2 (Two) Times a Day. 180 tablet 1   • lisinopril-hydrochlorothiazide (PRINZIDE,ZESTORETIC) 20-12.5 MG per tablet TAKE ONE-HALF TABLET BY MOUTH ONCE DAILY 45 tablet 1   • loratadine (CLARITIN) 10 MG tablet Take 1 tablet by mouth Daily.     • risperiDONE (RISPERDAL) 0.25 MG tablet Take 1  tablet by mouth Daily. 30 tablet 0   • risperiDONE (risperDAL) 0.5 MG tablet Take 1 tablet by mouth Every Night. At 8 PM 30 tablet 5   • sildenafil (REVATIO) 20 MG tablet TAKE 2-3 TABLETS AS NEEDED 30 tablet 0   • Testosterone Cypionate (DEPOTESTOTERONE CYPIONATE) 200 MG/ML injection INJECT ONE ML (CC) INTRAMUSCULARLY ONCE EVERY MONTH 3 mL 0   • [DISCONTINUED] SUMAtriptan (IMITREX) 100 MG tablet Take 1 tablet by mouth 1 (One) Time As Needed for migraine for up to 1 dose. 10 tablet 0     No current facility-administered medications on file prior to visit.        Allergies   Allergen Reactions   • Iodine Swelling and Other (See Comments)     Respiratory distress     • Amitriptyline      Dry mouth   • Bupropion Nausea Only   • Buspirone      No help with anxiety       Past Medical History:   Diagnosis Date   • Acute pancreatitis     10 times - in feb 2015;  [facility]; July 2016   • Aortic valve insufficiency    • Bursitis of right knee 2016   • Chronic anxiety     2017 severe anger intensity while on citalopram and risperidone   • COPD (chronic obstructive pulmonary disease)    • Depression    • Fracture of ankle 2008    left surgical repair   • GERD (gastroesophageal reflux disease)    • Hyperkalemia    • Hyperlipidemia    • Hypertension    • Low back pain    • Macular degeneration 2017    bilateral   • Migraine    • Perforated ulcer 2013   • Restless leg syndrome    • Substance abuse     history of drug and alcohol abuse; last alcohol use May 31, 2011   • Tendonitis of elbow, right    • Tension headache    • TMJ (dislocation of temporomandibular joint)    • Tobacco use    • Vitamin D deficiency        Past Surgical History:   Procedure Laterality Date   • APPENDECTOMY     • CHOLECYSTECTOMY     • HERNIA REPAIR  2014    ventral   • TEMPOROMANDIBULAR JOINT ARTHROPLASTY         Family History   Problem Relation Age of Onset   • Heart attack Mother      stents   • Heart disease Mother      pacemaker   • Macular  degeneration Mother    • Arthritis Mother    • Heart disease Father      CHF   • COPD Father    • Heart failure Father    • Asthma Father    • Fibromyalgia Sister    • Cancer Paternal Uncle      oral   • Heart disease Paternal Uncle      stents; CABG; non-smoker   • Bipolar disorder Other    • Depression Other    • Asthma Other    • COPD Other    • Arthritis Brother        Social History     Social History   • Marital status:      Spouse name: N/A   • Number of children: N/A   • Years of education: N/A     Occupational History   •       Social History Main Topics   • Smoking status: Current Every Day Smoker     Packs/day: 1.00     Years: 20.00     Types: Cigarettes     Start date: 1997   • Smokeless tobacco: Never Used      Comment: HIstory of 2 ppd for 20 years. Decreased to 4 cigarettes daily Spring of 2017.    • Alcohol use No   • Drug use: No   • Sexual activity: Not on file     Other Topics Concern   • Not on file     Social History Narrative    Domestic life: Lives in private home with wife - 12/2017 may be  from wife        Catholic: [employer]        Sleep hygiene:   9 PM to 2 AM due to work schedule         Caffeine use: 1 cup coffee and 4-5 Mountain Dews daily        Exercise habits: Walking at work - has increased this year        Diet: Well-balanced        Occupation:          Vision: Corrected with glasses; early bilateral macular degeneration        Hearing: No Impairment        Driving: No limitations       Review of Systems   Constitutional: Positive for activity change, appetite change, chills, diaphoresis, fatigue and fever (has felt feverish but did not take temp at home).   HENT: Positive for congestion (green ), sinus pain, sinus pressure, sneezing and voice change. Negative for sore throat and trouble swallowing.    Respiratory: Positive for cough.    Cardiovascular: Negative for chest pain.   Gastrointestinal: Negative for abdominal pain (diarrhea x1),  "anorexia, change in bowel habit, diarrhea, nausea and vomiting.   Musculoskeletal: Positive for arthralgias and myalgias. Negative for joint swelling and neck pain.   Skin: Negative for rash.   Allergic/Immunologic: Negative for environmental allergies.   Neurological: Positive for weakness and headaches. Negative for vertigo and numbness.       /72 (BP Location: Left arm, Patient Position: Sitting, Cuff Size: Adult)  Pulse 70  Temp 97.8 °F (36.6 °C) (Oral)   Resp 16  Ht 71 cm (27.95\")  Wt 74.2 kg (163 lb 9.6 oz)  SpO2 96%  .21 kg/m2    Objective   Physical Exam   Constitutional: He is oriented to person, place, and time. He appears well-developed and well-nourished. He is cooperative. He appears ill.   HENT:   Head: Normocephalic and atraumatic.   Right Ear: External ear and ear canal normal. A middle ear effusion is present.   Left Ear: External ear and ear canal normal. Tympanic membrane is injected.   Nose: Mucosal edema and rhinorrhea present. Right sinus exhibits maxillary sinus tenderness and frontal sinus tenderness. Left sinus exhibits maxillary sinus tenderness and frontal sinus tenderness.   Mouth/Throat: Uvula is midline and mucous membranes are normal. Oropharyngeal exudate (clear) and posterior oropharyngeal erythema present. No posterior oropharyngeal edema.   Eyes: Conjunctivae and lids are normal.   Cardiovascular: Normal heart sounds.    Pulmonary/Chest: Effort normal and breath sounds normal.   Lymphadenopathy:     He has cervical adenopathy.   Neurological: He is alert and oriented to person, place, and time.   Skin: Skin is warm and dry. No rash noted.   Psychiatric: He has a normal mood and affect. His speech is normal and behavior is normal.         Assessment/Plan   Obie was seen today for flu symptoms.    Diagnoses and all orders for this visit:    Flu-like symptoms  -     POCT Influenza A/B  -     brompheniramine-pseudoephedrine-DM 30-2-10 MG/5ML syrup; Take 5 mL by " mouth 4 (Four) Times a Day As Needed for Cough for up to 5 days.  -     fluticasone (FLONASE) 50 MCG/ACT nasal spray; 2 sprays into each nostril Every Night for 30 days. Administer 2 sprays in each nostril for each dose.  -     doxycycline (VIBRAMYCIN) 100 MG capsule; Take 1 capsule by mouth 2 (Two) Times a Day for 7 days.    Acute non-recurrent pansinusitis  -     brompheniramine-pseudoephedrine-DM 30-2-10 MG/5ML syrup; Take 5 mL by mouth 4 (Four) Times a Day As Needed for Cough for up to 5 days.  -     fluticasone (FLONASE) 50 MCG/ACT nasal spray; 2 sprays into each nostril Every Night for 30 days. Administer 2 sprays in each nostril for each dose.  -     doxycycline (VIBRAMYCIN) 100 MG capsule; Take 1 capsule by mouth 2 (Two) Times a Day for 7 days.        Results for orders placed or performed in visit on 01/08/18   POCT Influenza A/B   Result Value Ref Range    Rapid Influenza A Ag negative     Rapid Influenza B Ag negative     Internal Control Passed Passed    Lot Number [id number]     Expiration Date 4/2019        Follow up with PCP or go to the nearest emergency room if symptoms worsen or fail to improve.

## 2018-01-08 NOTE — PATIENT INSTRUCTIONS
Sinusitis, Adult  Sinusitis is soreness and inflammation of your sinuses. Sinuses are hollow spaces in the bones around your face. Your sinuses are located:  · Around your eyes.  · In the middle of your forehead.  · Behind your nose.  · In your cheekbones.  Your sinuses and nasal passages are lined with a stringy fluid (mucus). Mucus normally drains out of your sinuses. When your nasal tissues become inflamed or swollen, the mucus can become trapped or blocked so air cannot flow through your sinuses. This allows bacteria, viruses, and funguses to grow, which leads to infection.  Sinusitis can develop quickly and last for 7-10 days (acute) or for more than 12 weeks (chronic). Sinusitis often develops after a cold.  CAUSES  This condition is caused by anything that creates swelling in the sinuses or stops mucus from draining, including:  · Allergies.  · Asthma.  · Bacterial or viral infection.  · Abnormally shaped bones between the nasal passages.  · Nasal growths that contain mucus (nasal polyps).  · Narrow sinus openings.  · Pollutants, such as chemicals or irritants in the air.  · A foreign object stuck in the nose.  · A fungal infection. This is rare.  RISK FACTORS  The following factors may make you more likely to develop this condition:  · Having allergies or asthma.  · Having had a recent cold or respiratory tract infection.  · Having structural deformities or blockages in your nose or sinuses.  · Having a weak immune system.  · Doing a lot of swimming or diving.  · Overusing nasal sprays.  · Smoking.  SYMPTOMS  The main symptoms of this condition are pain and a feeling of pressure around the affected sinuses. Other symptoms include:  · Upper toothache.  · Earache.  · Headache.  · Bad breath.  · Decreased sense of smell and taste.  · A cough that may get worse at night.  · Fatigue.  · Fever.  · Thick drainage from your nose. The drainage is often green and it may contain pus (purulent).  · Stuffy nose or  congestion.  · Postnasal drip. This is when extra mucus collects in the throat or back of the nose.  · Swelling and warmth over the affected sinuses.  · Sore throat.  · Sensitivity to light.  DIAGNOSIS  This condition is diagnosed based on symptoms, a medical history, and a physical exam. To find out if your condition is acute or chronic, your health care provider may:  · Look in your nose for signs of nasal polyps.  · Tap over the affected sinus to check for signs of infection.  · View the inside of your sinuses using an imaging device that has a light attached (endoscope).  If your health care provider suspects that you have chronic sinusitis, you may also:  · Be tested for allergies.  · Have a sample of mucus taken from your nose (nasal culture) and checked for bacteria.  · Have a mucus sample examined to see if your sinusitis is related to an allergy.  If your sinusitis does not respond to treatment and it lasts longer than 8 weeks, you may have an MRI or CT scan to check your sinuses. These scans also help to determine how severe your infection is.  In rare cases, a bone biopsy may be done to rule out more serious types of fungal sinus disease.  TREATMENT  Treatment for sinusitis depends on the cause and whether your condition is chronic or acute. If a virus is causing your sinusitis, your symptoms will go away on their own within 10 days. You may be given medicines to relieve your symptoms, including:  · Topical nasal decongestants. They shrink swollen nasal passages and let mucus drain from your sinuses.  · Antihistamines. These drugs block inflammation that is triggered by allergies. This can help to ease swelling in your nose and sinuses.  · Topical nasal corticosteroids. These are nasal sprays that ease inflammation and swelling in your nose and sinuses.  · Nasal saline washes. These rinses can help to get rid of thick mucus in your nose.  If your condition is caused by bacteria, you will be given an  antibiotic medicine. If your condition is caused by a fungus, you will be given an antifungal medicine.  Surgery may be needed to correct underlying conditions, such as narrow nasal passages. Surgery may also be needed to remove polyps.  HOME CARE INSTRUCTIONS  Medicines  · Take, use, or apply over-the-counter and prescription medicines only as told by your health care provider. These may include nasal sprays.  · If you were prescribed an antibiotic medicine, take it as told by your health care provider. Do not stop taking the antibiotic even if you start to feel better.  Hydrate and Humidify  · Drink enough water to keep your urine clear or pale yellow. Staying hydrated will help to thin your mucus.  · Use a cool mist humidifier to keep the humidity level in your home above 50%.  · Inhale steam for 10-15 minutes, 3-4 times a day or as told by your health care provider. You can do this in the bathroom while a hot shower is running.  · Limit your exposure to cool or dry air.  Rest  · Rest as much as possible.  · Sleep with your head raised (elevated).  · Make sure to get enough sleep each night.  General Instructions  · Apply a warm, moist washcloth to your face 3-4 times a day or as told by your health care provider. This will help with discomfort.  · Wash your hands often with soap and water to reduce your exposure to viruses and other germs. If soap and water are not available, use hand .  · Do not smoke. Avoid being around people who are smoking (secondhand smoke).  · Keep all follow-up visits as told by your health care provider. This is important.  SEEK MEDICAL CARE IF:  · You have a fever.  · Your symptoms get worse.  · Your symptoms do not improve within 10 days.  SEEK IMMEDIATE MEDICAL CARE IF:  · You have a severe headache.  · You have persistent vomiting.  · You have pain or swelling around your face or eyes.  · You have vision problems.  · You develop confusion.  · Your neck is stiff.  · You have  trouble breathing.     This information is not intended to replace advice given to you by your health care provider. Make sure you discuss any questions you have with your health care provider.     Document Released: 12/18/2006 Document Revised: 04/10/2017 Document Reviewed: 10/12/2016  [employer] Interactive Patient Education ©2017 [employer]  Sinus Rinse  WHAT IS A SINUS RINSE?  A sinus rinse is a simple home treatment that is used to rinse your sinuses with a sterile mixture of salt and water (saline solution). Sinuses are air-filled spaces in your skull behind the bones of your face and forehead that open into your nasal cavity.  You will use the following:  · Saline solution.  · Neti pot or spray bottle. This releases the saline solution into your nose and through your sinuses. Neti pots and spray bottles can be purchased at your local pharmacy, a [employer] store, or online.  WHEN WOULD I DO A SINUS RINSE?  A sinus rinse can help to clear mucus, dirt, dust, or pollen from the nasal cavity. You may do a sinus rinse when you have a cold, a virus, nasal allergy symptoms, a sinus infection, or stuffiness in the nose or sinuses.  If you are considering a sinus rinse:  · Ask your child's health care provider before performing a sinus rinse on your child.  · Do not do a sinus rinse if you have had ear or nasal surgery, ear infection, or blocked ears.  HOW DO I DO A SINUS RINSE?  · Wash your hands.  · Disinfect your device according to the directions provided and then dry it.  · Use the solution that comes with your device or one that is sold separately in stores. Follow the mixing directions on the package.  · Fill your device with the amount of saline solution as directed by the device instructions.  · Stand over a sink and tilt your head sideways over the sink.  · Place the spout of the device in your upper nostril (the one closer to the ceiling).  · Gently pour or squeeze the saline solution into the nasal  cavity. The liquid should drain to the lower nostril if you are not overly congested.  · Gently blow your nose. Blowing too hard may cause ear pain.  · Repeat in the other nostril.  · Clean and rinse your device with clean water and then air-dry it.  ARE THERE RISKS OF A SINUS RINSE?   Sinus rinse is generally very safe and effective. However, there are a few risks, which include:   · A burning sensation in the sinuses. This may happen if you do not make the saline solution as directed. Make sure to follow all directions when making the saline solution.  · Infection from contaminated water. This is rare, but possible.  · Nasal irritation.     This information is not intended to replace advice given to you by your health care provider. Make sure you discuss any questions you have with your health care provider.     Document Released: 07/15/2015 Document Reviewed: 07/15/2015  [employer] Interactive Patient Education ©2017 [employer]

## 2018-01-10 RX ORDER — LISINOPRIL AND HYDROCHLOROTHIAZIDE 20; 12.5 MG/1; MG/1
TABLET ORAL
Qty: 45 TABLET | Refills: 1 | Status: CANCELLED | OUTPATIENT
Start: 2018-01-10

## 2018-01-10 RX ORDER — ALPRAZOLAM 0.5 MG/1
TABLET ORAL
Qty: 30 TABLET | Refills: 0 | Status: CANCELLED | OUTPATIENT
Start: 2018-01-10

## 2018-01-11 ENCOUNTER — APPOINTMENT (OUTPATIENT)
Dept: LAB | Facility: HOSPITAL | Age: 53
End: 2018-01-11

## 2018-01-11 ENCOUNTER — OFFICE VISIT (OUTPATIENT)
Dept: INTERNAL MEDICINE | Facility: CLINIC | Age: 53
End: 2018-01-11

## 2018-01-11 VITALS
BODY MASS INDEX: 148.47 KG/M2 | OXYGEN SATURATION: 97 % | DIASTOLIC BLOOD PRESSURE: 60 MMHG | HEART RATE: 64 BPM | SYSTOLIC BLOOD PRESSURE: 110 MMHG | TEMPERATURE: 97.6 F | WEIGHT: 165 LBS | RESPIRATION RATE: 16 BRPM

## 2018-01-11 DIAGNOSIS — J01.90 ACUTE NON-RECURRENT SINUSITIS, UNSPECIFIED LOCATION: ICD-10-CM

## 2018-01-11 DIAGNOSIS — F33.41 RECURRENT MAJOR DEPRESSIVE DISORDER, IN PARTIAL REMISSION (HCC): ICD-10-CM

## 2018-01-11 DIAGNOSIS — I10 ESSENTIAL HYPERTENSION: ICD-10-CM

## 2018-01-11 DIAGNOSIS — K21.9 GASTROESOPHAGEAL REFLUX DISEASE WITHOUT ESOPHAGITIS: ICD-10-CM

## 2018-01-11 DIAGNOSIS — F41.9 ANXIETY: ICD-10-CM

## 2018-01-11 DIAGNOSIS — E78.49 OTHER HYPERLIPIDEMIA: Primary | ICD-10-CM

## 2018-01-11 LAB
ALBUMIN SERPL-MCNC: 4.2 G/DL (ref 3.2–4.8)
ALBUMIN/GLOB SERPL: 1.8 G/DL (ref 1.5–2.5)
ALP SERPL-CCNC: 62 U/L (ref 25–100)
ALT SERPL W P-5'-P-CCNC: 16 U/L (ref 7–40)
ANION GAP SERPL CALCULATED.3IONS-SCNC: 5 MMOL/L (ref 3–11)
ARTICHOKE IGE QN: 112 MG/DL (ref 0–130)
AST SERPL-CCNC: 12 U/L (ref 0–33)
BILIRUB SERPL-MCNC: 0.2 MG/DL (ref 0.3–1.2)
BUN BLD-MCNC: 20 MG/DL (ref 9–23)
BUN/CREAT SERPL: 22.2 (ref 7–25)
CALCIUM SPEC-SCNC: 10.3 MG/DL (ref 8.7–10.4)
CHLORIDE SERPL-SCNC: 104 MMOL/L (ref 99–109)
CHOLEST SERPL-MCNC: 162 MG/DL (ref 0–200)
CO2 SERPL-SCNC: 30 MMOL/L (ref 20–31)
CREAT BLD-MCNC: 0.9 MG/DL (ref 0.6–1.3)
GFR SERPL CREATININE-BSD FRML MDRD: 88 ML/MIN/1.73
GLOBULIN UR ELPH-MCNC: 2.3 GM/DL
GLUCOSE BLD-MCNC: 91 MG/DL (ref 70–100)
HDLC SERPL-MCNC: 42 MG/DL (ref 40–60)
POTASSIUM BLD-SCNC: 4.5 MMOL/L (ref 3.5–5.5)
PROT SERPL-MCNC: 6.5 G/DL (ref 5.7–8.2)
SODIUM BLD-SCNC: 139 MMOL/L (ref 132–146)
TRIGL SERPL-MCNC: 240 MG/DL (ref 0–150)

## 2018-01-11 PROCEDURE — 99214 OFFICE O/P EST MOD 30 MIN: CPT | Performed by: INTERNAL MEDICINE

## 2018-01-11 PROCEDURE — 80053 COMPREHEN METABOLIC PANEL: CPT | Performed by: INTERNAL MEDICINE

## 2018-01-11 PROCEDURE — 80061 LIPID PANEL: CPT | Performed by: INTERNAL MEDICINE

## 2018-01-11 PROCEDURE — 36415 COLL VENOUS BLD VENIPUNCTURE: CPT | Performed by: INTERNAL MEDICINE

## 2018-01-11 RX ORDER — RISPERIDONE 0.5 MG/1
0.5 TABLET ORAL NIGHTLY
Qty: 90 TABLET | Refills: 3 | Status: SHIPPED | OUTPATIENT
Start: 2018-01-11 | End: 2021-11-15

## 2018-01-11 RX ORDER — ALPRAZOLAM 0.5 MG/1
0.5 TABLET ORAL DAILY PRN
Qty: 30 TABLET | Refills: 1 | OUTPATIENT
Start: 2018-01-11 | End: 2018-03-09 | Stop reason: SDUPTHER

## 2018-01-11 RX ORDER — TESTOSTERONE CYPIONATE 200 MG/ML
200 INJECTION, SOLUTION INTRAMUSCULAR
Qty: 3 ML | Refills: 0 | OUTPATIENT
Start: 2018-01-11 | End: 2018-04-02 | Stop reason: SDUPTHER

## 2018-01-11 RX ORDER — LISINOPRIL AND HYDROCHLOROTHIAZIDE 20; 12.5 MG/1; MG/1
0.5 TABLET ORAL DAILY
Qty: 45 TABLET | Refills: 1 | Status: SHIPPED | OUTPATIENT
Start: 2018-01-11 | End: 2021-11-15

## 2018-01-11 RX ORDER — RISPERIDONE 0.25 MG/1
0.25 TABLET ORAL DAILY
Qty: 90 TABLET | Refills: 3 | Status: SHIPPED | OUTPATIENT
Start: 2018-01-11 | End: 2021-11-15

## 2018-01-11 NOTE — PATIENT INSTRUCTIONS
1.  Continue usual medicines and supplements - as listed.    2.  Use ibuprofen - only once or twice weekly as needed - for severe pain.    3.  Finish doxycycline - to clear sinus infection.    4.  The nurse will call with test results.    5.  Return visit 2 months - fasting checkup.

## 2018-01-11 NOTE — PROGRESS NOTES
Mata Tyson is a 52 y.o. male.     History of Present Illness     The patient has many years of severe chronic anxiety and clinical depression.  He has a prior history of substance abuse including alcohol and tobacco.  He is been off of alcohol and street drugs now for many years.  He has done well on citalopram but developed a severe degree of anger last fall.  He has increased his risperidone and feels that he is less tense and angry.  He does occasionally use low-dose alprazolam at peak events.    The following portions of the patient's history were reviewed and updated as appropriate: allergies, current medications, past family history, past medical history, past social history, past surgical history and problem list.    Review of Systems   Constitutional: Negative for appetite change and fatigue.   HENT: Positive for congestion and sinus pain. Negative for ear pain and sore throat.    Respiratory: Positive for cough and shortness of breath.    Cardiovascular: Negative for chest pain and palpitations.   Gastrointestinal: Negative for abdominal pain and nausea.   Musculoskeletal: Negative for arthralgias and back pain.   Neurological: Negative for dizziness and headaches.   Psychiatric/Behavioral: Positive for sleep disturbance. Negative for dysphoric mood. The patient is nervous/anxious.        Objective   Blood pressure 110/60, pulse 64, temperature 97.6 °F (36.4 °C), temperature source Oral, resp. rate 16, weight 74.8 kg (165 lb), SpO2 97 %.    Physical Exam   Constitutional: He is oriented to person, place, and time. He appears well-developed and well-nourished.   mild general distress   HENT:   Right Ear: External ear normal.   Left Ear: External ear normal.   Moderate edema erythema oropharynx   Neck: Normal range of motion. Neck supple. No JVD present.   Cardiovascular: Normal rate, regular rhythm and normal heart sounds.    Pulmonary/Chest: Effort normal and breath sounds normal. He has no  wheezes. He has no rales.   Lymphadenopathy:     He has no cervical adenopathy.   Neurological: He is alert and oriented to person, place, and time. He exhibits normal muscle tone. Coordination normal.   Psychiatric: His behavior is normal. Judgment normal.   Moderate anxiety with low mood   Nursing note and vitals reviewed.    Procedures  Assessment/Plan   Obie was seen today for anxiety.    Diagnoses and all orders for this visit:    Other hyperlipidemia  -     Comprehensive Metabolic Panel  -     Lipid Panel    Essential hypertension    Gastroesophageal reflux disease without esophagitis    Anxiety  -     risperiDONE (RISPERDAL) 0.25 MG tablet; Take 1 tablet by mouth Daily.    Recurrent major depressive disorder, in partial remission    Acute non-recurrent sinusitis, unspecified location    Other orders  -     risperiDONE (risperDAL) 0.5 MG tablet; Take 1 tablet by mouth Every Night. At 8 PM  -     ALPRAZolam (XANAX) 0.5 MG tablet; Take 1 tablet by mouth Daily As Needed for Anxiety.  -     lisinopril-hydrochlorothiazide (PRINZIDE,ZESTORETIC) 20-12.5 MG per tablet; Take 0.5 tablets by mouth Daily.  -     Testosterone Cypionate (DEPOTESTOTERONE CYPIONATE) 200 MG/ML injection; Inject 1 mL into the shoulder, thigh, or buttocks Every 28 (Twenty-Eight) Days.      The patient developed severe increase anxiety with anger management difficulties.  He has expressed difficulty with marital discord in recent months.  He has had severe conflicts with people at work but currently is on good terms with his employer and works regularly.  I've asked him to stay with citalopram and risperidone on a regular basis.  As needed Xanax may be especially helpful if not used daily.    Patient has moderately severe hyperlipidemia and high risk for cardiovascular disease.  His LDL cholesterol had been 70 or 80 last year.  His LDL cholesterol now at 112 suggested he may be missing pills.    The patient had a recent sinusitis and mitral  walk-in clinic January 8.  He is improving now on doxycycline and should finish his course of antibiotics.    Patient Instructions   1.  Continue usual medicines and supplements - as listed.    2.  Use ibuprofen - only once or twice weekly as needed - for severe pain.    3.  Finish doxycycline - to clear sinus infection.    4.  The nurse will call with test results.    5.  Return visit 2 months - fasting checkup.    6.  LDL cholesterol more elevated  at 112.  Be consistent with pravastatin daily.    7.  Chemistry panel is acceptable.    Electronically signed Earl Julian M.D.1/14/2018 12:41 PM

## 2018-01-15 ENCOUNTER — TELEPHONE (OUTPATIENT)
Dept: INTERNAL MEDICINE | Facility: CLINIC | Age: 53
End: 2018-01-15

## 2018-01-15 NOTE — TELEPHONE ENCOUNTER
Called labs to pt.  Per TGF:  LDL more elevated at 112.  Be consistent with pravastatin daily.  Chemistry panel is acceptable.  Pt states he missed some pravastatin when he ran out while moving.  He has it now and won't miss more doses.

## 2018-02-09 ENCOUNTER — TELEPHONE (OUTPATIENT)
Dept: INTERNAL MEDICINE | Facility: CLINIC | Age: 53
End: 2018-02-09

## 2018-02-09 NOTE — TELEPHONE ENCOUNTER
Pt notified a 30 day fill (#30) w 1 RF [facility] so should have a refill there. Pt verb understanding.

## 2018-03-09 RX ORDER — ALPRAZOLAM 0.5 MG/1
TABLET ORAL
Qty: 30 TABLET | Refills: 1 | OUTPATIENT
Start: 2018-03-09 | End: 2018-05-09 | Stop reason: SDUPTHER

## 2018-03-29 ENCOUNTER — OFFICE VISIT (OUTPATIENT)
Dept: INTERNAL MEDICINE | Facility: CLINIC | Age: 53
End: 2018-03-29

## 2018-03-29 ENCOUNTER — APPOINTMENT (OUTPATIENT)
Dept: LAB | Facility: HOSPITAL | Age: 53
End: 2018-03-29

## 2018-03-29 VITALS
SYSTOLIC BLOOD PRESSURE: 106 MMHG | OXYGEN SATURATION: 97 % | HEART RATE: 68 BPM | WEIGHT: 160 LBS | BODY MASS INDEX: 143.97 KG/M2 | DIASTOLIC BLOOD PRESSURE: 60 MMHG | TEMPERATURE: 98.3 F | RESPIRATION RATE: 16 BRPM

## 2018-03-29 DIAGNOSIS — N42.9 DISORDER OF PROSTATE: ICD-10-CM

## 2018-03-29 DIAGNOSIS — E34.9 TESTOSTERONE DEFICIENCY: ICD-10-CM

## 2018-03-29 DIAGNOSIS — R41.840 ATTENTION DISTURBANCE: ICD-10-CM

## 2018-03-29 DIAGNOSIS — R53.83 FATIGUE, UNSPECIFIED TYPE: ICD-10-CM

## 2018-03-29 DIAGNOSIS — E78.49 OTHER HYPERLIPIDEMIA: ICD-10-CM

## 2018-03-29 DIAGNOSIS — I10 ESSENTIAL HYPERTENSION: Primary | ICD-10-CM

## 2018-03-29 DIAGNOSIS — J44.9 CHRONIC OBSTRUCTIVE BRONCHITIS (HCC): ICD-10-CM

## 2018-03-29 PROBLEM — J01.90 ACUTE SINUSITIS: Status: RESOLVED | Noted: 2018-01-11 | Resolved: 2018-03-29

## 2018-03-29 LAB
ALBUMIN SERPL-MCNC: 4.5 G/DL (ref 3.2–4.8)
ALBUMIN/GLOB SERPL: 2 G/DL (ref 1.5–2.5)
ALP SERPL-CCNC: 75 U/L (ref 25–100)
ALT SERPL W P-5'-P-CCNC: 26 U/L (ref 7–40)
ANION GAP SERPL CALCULATED.3IONS-SCNC: 7 MMOL/L (ref 3–11)
ARTICHOKE IGE QN: 110 MG/DL (ref 0–130)
AST SERPL-CCNC: 20 U/L (ref 0–33)
BILIRUB SERPL-MCNC: 0.3 MG/DL (ref 0.3–1.2)
BUN BLD-MCNC: 13 MG/DL (ref 9–23)
BUN/CREAT SERPL: 14.4 (ref 7–25)
CALCIUM SPEC-SCNC: 10.4 MG/DL (ref 8.7–10.4)
CHLORIDE SERPL-SCNC: 104 MMOL/L (ref 99–109)
CHOLEST SERPL-MCNC: 181 MG/DL (ref 0–200)
CO2 SERPL-SCNC: 29 MMOL/L (ref 20–31)
CREAT BLD-MCNC: 0.9 MG/DL (ref 0.6–1.3)
GFR SERPL CREATININE-BSD FRML MDRD: 88 ML/MIN/1.73
GLOBULIN UR ELPH-MCNC: 2.3 GM/DL
GLUCOSE BLD-MCNC: 97 MG/DL (ref 70–100)
HDLC SERPL-MCNC: 52 MG/DL (ref 40–60)
POTASSIUM BLD-SCNC: 4.4 MMOL/L (ref 3.5–5.5)
PROT SERPL-MCNC: 6.8 G/DL (ref 5.7–8.2)
PSA SERPL-MCNC: 1.78 NG/ML (ref 0–4)
SODIUM BLD-SCNC: 140 MMOL/L (ref 132–146)
TESTOST SERPL-MCNC: 396.54 NG/DL (ref 86.98–780.1)
TRIGL SERPL-MCNC: 239 MG/DL (ref 0–150)

## 2018-03-29 PROCEDURE — 36415 COLL VENOUS BLD VENIPUNCTURE: CPT | Performed by: INTERNAL MEDICINE

## 2018-03-29 PROCEDURE — G0103 PSA SCREENING: HCPCS | Performed by: INTERNAL MEDICINE

## 2018-03-29 PROCEDURE — 99213 OFFICE O/P EST LOW 20 MIN: CPT | Performed by: INTERNAL MEDICINE

## 2018-03-29 PROCEDURE — 80061 LIPID PANEL: CPT | Performed by: INTERNAL MEDICINE

## 2018-03-29 PROCEDURE — 80053 COMPREHEN METABOLIC PANEL: CPT | Performed by: INTERNAL MEDICINE

## 2018-03-29 PROCEDURE — 84403 ASSAY OF TOTAL TESTOSTERONE: CPT | Performed by: INTERNAL MEDICINE

## 2018-03-29 RX ORDER — ESCITALOPRAM OXALATE 20 MG/1
20 TABLET ORAL DAILY
Qty: 90 TABLET | Refills: 3 | Status: SHIPPED | OUTPATIENT
Start: 2018-03-29 | End: 2021-11-15

## 2018-03-29 NOTE — PROGRESS NOTES
Mata Tyson is a 52 y.o. male.     History of Present Illness     The patient's had an adult history of severe anxiety, grade with depression and agitation.  He has used drugs and alcohol in past years.  He has stopped all alcohol since 2011.  He started citalopram and amitriptyline and has had multiple recurrence of severe illness including pancreatitis since then.  He is employed full time and feels he is contributing well.  He has had episodes of recurring anger and is concerned that he may make her behavior mistake to impair his well-being..    The following portions of the patient's history were reviewed and updated as appropriate: allergies, current medications, past family history, past medical history, past social history, past surgical history and problem list.    Review of Systems   Constitutional: Positive for fatigue. Negative for appetite change.   Respiratory: Negative for cough and shortness of breath.    Gastrointestinal: Negative for abdominal distention and nausea.   Neurological: Negative for dizziness and light-headedness.   Psychiatric/Behavioral: Positive for agitation, dysphoric mood and sleep disturbance. The patient is nervous/anxious.        Objective   Blood pressure 106/60, pulse 68, temperature 98.3 °F (36.8 °C), temperature source Oral, resp. rate 16, weight 72.6 kg (160 lb), SpO2 97 %.    Physical Exam   Constitutional: He is oriented to person, place, and time. He appears well-developed and well-nourished. No distress.   Cardiovascular: Normal rate, regular rhythm and normal heart sounds.    Pulmonary/Chest: Effort normal and breath sounds normal. He has no wheezes. He has no rales.   Neurological: He is alert and oriented to person, place, and time.   Psychiatric: His behavior is normal. Judgment and thought content normal.   The patient is emotionally distressed.  He is remarkably insightful and conversant.   Nursing note and vitals  reviewed.    Procedures  Assessment/Plan   Obie was seen today for anxiety.    Diagnoses and all orders for this visit:    Essential hypertension    Other hyperlipidemia  -     Comprehensive Metabolic Panel  -     Lipid Panel    Chronic obstructive bronchitis    Testosterone deficiency  -     Testosterone    Disorder of prostate  -     PSA Screen    Fatigue, unspecified type    Attention disturbance    Other orders  -     escitalopram (LEXAPRO) 20 MG tablet; Take 1 tablet by mouth Daily.      The patient's chronic agitation and anxiety are difficult problems.  After close discussion it appears that he has been off his citalopram over the last 2 months.  I've asked him to start back on Lexapro due to give him the advantage of improved anxiety control.  I've also counseled him on finding a new counselor.      The patient has moderate COPD.  His lungs are mildly tight at this time.  I've asked him to use Spiriva for more constant symptom control will albuterol for more rescue control.    Patient's been on testosterone replacement for many years.  Remarkably his testosterone level is almost 400 and he has not had an injection in 30 days.  He should consider lowering the dose to minimize testosterone toxicity.    Patient Instructions   1.  Plan new counselor for stress management - call the office tomorrow.    2.  Use Spiriva 1 puff daily as needed - or use albuterol 1 puff  - for breathing.    3.  Continue usual medicines and supplements - as listed.    4.  Stop citalopram - start escitalopram 20 mg daily - to level off emotions.    5.  Return visit 3 months - annual checkup fasting.     6.  Testosterone level mildly elevated at 1 month after dose.  Decrease testosterone injection 150 MG monthly.    7.  PSA level is acceptable.    8.  LDL cholesterol remains borderline controlled at 110.  Increase atorvastatin 80 mg daily.    Electronically signed Earl Julian M.D.3/31/2018 3:45 PM

## 2018-03-29 NOTE — PATIENT INSTRUCTIONS
1.  Plan new counselor for stress management - call the office tomorrow.    2.  Use Spiriva 1 puff daily as needed - or use albuterol 1 puff  - for breathing.    3.  Continue usual medicines and supplements - as listed.    4.  Stop citalopram - start escitalopram 20 mg daily - to level off emotions.    5.  Return visit 3 months - annual checkup fasting.

## 2018-04-02 ENCOUNTER — TELEPHONE (OUTPATIENT)
Dept: INTERNAL MEDICINE | Facility: CLINIC | Age: 53
End: 2018-04-02

## 2018-04-02 RX ORDER — TESTOSTERONE CYPIONATE 200 MG/ML
150 INJECTION, SOLUTION INTRAMUSCULAR
Qty: 3 ML | Refills: 0
Start: 2018-04-02 | End: 2021-11-15

## 2018-04-02 RX ORDER — ATORVASTATIN CALCIUM 80 MG/1
80 TABLET, FILM COATED ORAL EVERY EVENING
Start: 2018-04-02 | End: 2021-11-15

## 2018-04-02 RX ORDER — TESTOSTERONE CYPIONATE 200 MG/ML
150 INJECTION, SOLUTION INTRAMUSCULAR ONCE
Status: DISCONTINUED | OUTPATIENT
Start: 2018-04-02 | End: 2018-04-02

## 2018-04-02 NOTE — TELEPHONE ENCOUNTER
Called labs to pt.  Per TGF  Testosterone level mildly elevated at 1 month after dose.  Decrease testosterone injection 150 MG monthly.   PSA level is acceptable.   LDL cholesterol remains borderline controlled at 110.  Increase atorvastatin 80 mg daily.  Pt verb understanding.

## 2018-04-03 ENCOUNTER — OFFICE VISIT (OUTPATIENT)
Dept: PSYCHIATRY | Facility: CLINIC | Age: 53
End: 2018-04-03

## 2018-04-03 DIAGNOSIS — F33.2 SEVERE EPISODE OF RECURRENT MAJOR DEPRESSIVE DISORDER, WITHOUT PSYCHOTIC FEATURES (HCC): Primary | ICD-10-CM

## 2018-04-03 DIAGNOSIS — Z63.5 DIVORCE: ICD-10-CM

## 2018-04-03 DIAGNOSIS — R45.4 ANGER: ICD-10-CM

## 2018-04-03 PROCEDURE — 90791 PSYCH DIAGNOSTIC EVALUATION: CPT | Performed by: PSYCHOLOGIST

## 2018-04-03 SDOH — SOCIAL STABILITY - SOCIAL INSECURITY: DISRUPTION OF FAMILY BY SEPARATION AND DIVORCE: Z63.5

## 2018-04-04 NOTE — PROGRESS NOTES
PROGRESS NOTE    Data:  Obie Tyson is a 52 y.o. male who met with the undersigned for a scheduled individualoutpatient psychotherapy session from 3:20 - 4:00pm.      Clinical Maneuvering/Intervention:      Pt talked about struggling with managing his anger and feeling crushed by his wife leaving him after 18 years. A psychological evaluation was conducted in order to assess past and current level of functioning. Areas assessed included, but were not limited to: perception of social support, perception of ability to face and deal with challenges in life (positive functioning), anxiety symptoms, depressive symptoms, perspective on beliefs/belief system, coping skills for stress, intelligence level, etc. Therapeutic rapport was initiated. He was quite tearful when talking about his wife leaving him and not knowing why. He admitted to sleeping in his car before letting his sister take him in. He also admitted to often holding feelings in and not sharing his problems with others very often, even with his mother and sister who care about him and would support him. He was commended for coming in today and being open. Interventions conducted today were geared towards initiating the healing process after his wife  him and education about why anger problems arise/how to deal with it. He said that he still will not likely share his struggles with his mother or sister because he does not want to pull them down with him. The problems with holding things in was discussed as was the idea that he can share things with them as well as let them lean on him in return. He was asked to think about this. Education was also provided as to the nature of counseling and what to expect in subsequent sessions. A treatment plan was initiated tailored to meeting pt’s presenting needs. The pt was encouraged to return for additional sessions and agreed to do so.     Mental Status Exam  Hygiene:  good  Dress: normal  Attitude:  disgruntled   Motor Activity: normal  Speech: fast  Mood:  depressed and angry  Affect:  congruent  Thought Processes: normal  Thought Content:  negative and helpless  Suicidal Thoughts:  not endorsed  Homicidal Thoughts:  not endorsed  Crisis Safety Plan: not needed   Hallucinations:  none      Patient's Support Network Includes:  family, co-workers      Progress toward goal: there is evidence to suggest that he is taking a step in the right direction by trying counseling for the first time and being more open about how he feels than he typically would be      Functional Status: moderate      Prognosis: good    Assessment      The pt presented as depressed and struggling to manage anger. He has little insight as to why his wife left him. He is a good candidate for counseling.       Plan      In order to diminish symptoms of depression, the pt will share at least some of his struggles and distress with supportive people (mother and sister) in order to decrease tension and start healing from his marriage ending.     Aliyah Zabala, PhD, LP

## 2018-05-09 RX ORDER — ALPRAZOLAM 0.5 MG/1
TABLET ORAL
Qty: 30 TABLET | Refills: 1 | OUTPATIENT
Start: 2018-05-09 | End: 2018-07-09 | Stop reason: SDUPTHER

## 2018-07-09 RX ORDER — ALPRAZOLAM 0.5 MG/1
TABLET ORAL
Qty: 30 TABLET | Refills: 1 | Status: CANCELLED | OUTPATIENT
Start: 2018-07-09

## 2018-07-09 RX ORDER — ALPRAZOLAM 0.5 MG/1
TABLET ORAL
Qty: 30 TABLET | Refills: 0 | OUTPATIENT
Start: 2018-07-09 | End: 2021-11-15

## 2018-07-09 NOTE — TELEPHONE ENCOUNTER
Unable to reach pt: N/A & mailbox full at ph# given to .  Home # busy.  Will cont attempts to reach.    Per TGF, pt needs to have July f/up ox scheduled w Dr ALONDRA Sepulveda before he can auth #[id number] terax

## 2018-07-09 NOTE — TELEPHONE ENCOUNTER
Reached pt at [facility] [phone].  7/18/18 ox w/ Dr Sepulveda scheduled.

## 2018-08-10 RX ORDER — ALPRAZOLAM 0.5 MG/1
TABLET ORAL
Qty: 30 TABLET | Refills: 0 | OUTPATIENT
Start: 2018-08-10

## 2018-10-24 RX ORDER — GABAPENTIN 600 MG/1
TABLET ORAL
Qty: 180 TABLET | Refills: 1 | OUTPATIENT
Start: 2018-10-24

## 2021-11-15 ENCOUNTER — APPOINTMENT (OUTPATIENT)
Dept: GENERAL RADIOLOGY | Facility: HOSPITAL | Age: 56
End: 2021-11-15

## 2021-11-15 ENCOUNTER — HOSPITAL ENCOUNTER (EMERGENCY)
Facility: HOSPITAL | Age: 56
Discharge: HOME | End: 2021-11-16
Attending: EMERGENCY MEDICINE | Admitting: EMERGENCY MEDICINE

## 2021-11-15 DIAGNOSIS — B37.42 CANDIDAL BALANITIS: ICD-10-CM

## 2021-11-15 DIAGNOSIS — E11.9 NEW ONSET TYPE 2 DIABETES MELLITUS (HCC): Primary | ICD-10-CM

## 2021-11-15 LAB
ALBUMIN SERPL-MCNC: 4.6 G/DL (ref 3.5–5.2)
ALBUMIN/GLOB SERPL: 1.8 G/DL
ALP SERPL-CCNC: 119 U/L (ref 39–117)
ALT SERPL W P-5'-P-CCNC: 14 U/L (ref 1–41)
ANION GAP SERPL CALCULATED.3IONS-SCNC: 14 MMOL/L (ref 5–15)
AST SERPL-CCNC: 11 U/L (ref 1–40)
B-OH-BUTYR SERPL-SCNC: 1.06 MMOL/L (ref 0.02–0.27)
BASOPHILS # BLD AUTO: 0.08 10*3/MM3 (ref 0–0.2)
BASOPHILS NFR BLD AUTO: 0.8 % (ref 0–1.5)
BILIRUB SERPL-MCNC: 0.3 MG/DL (ref 0–1.2)
BILIRUB UR QL STRIP: NEGATIVE
BUN SERPL-MCNC: 20 MG/DL (ref 6–20)
BUN/CREAT SERPL: 19.8 (ref 7–25)
CALCIUM SPEC-SCNC: 10.5 MG/DL (ref 8.6–10.5)
CHLORIDE SERPL-SCNC: 95 MMOL/L (ref 98–107)
CLARITY UR: CLEAR
CO2 SERPL-SCNC: 20 MMOL/L (ref 22–29)
COLOR UR: YELLOW
CREAT SERPL-MCNC: 1.01 MG/DL (ref 0.76–1.27)
DEPRECATED RDW RBC AUTO: 37.4 FL (ref 37–54)
EOSINOPHIL # BLD AUTO: 0.19 10*3/MM3 (ref 0–0.4)
EOSINOPHIL NFR BLD AUTO: 1.9 % (ref 0.3–6.2)
ERYTHROCYTE [DISTWIDTH] IN BLOOD BY AUTOMATED COUNT: 11.1 % (ref 12.3–15.4)
GFR SERPL CREATININE-BSD FRML MDRD: 76 ML/MIN/1.73
GLOBULIN UR ELPH-MCNC: 2.6 GM/DL
GLUCOSE BLDC GLUCOMTR-MCNC: 247 MG/DL (ref 70–130)
GLUCOSE BLDC GLUCOMTR-MCNC: 346 MG/DL (ref 70–130)
GLUCOSE SERPL-MCNC: 435 MG/DL (ref 65–99)
GLUCOSE UR STRIP-MCNC: ABNORMAL MG/DL
HCT VFR BLD AUTO: 46 % (ref 37.5–51)
HGB BLD-MCNC: 16.6 G/DL (ref 13–17.7)
HGB UR QL STRIP.AUTO: NEGATIVE
HOLD SPECIMEN: NORMAL
IMM GRANULOCYTES # BLD AUTO: 0.03 10*3/MM3 (ref 0–0.05)
IMM GRANULOCYTES NFR BLD AUTO: 0.3 % (ref 0–0.5)
KETONES UR QL STRIP: ABNORMAL
LEUKOCYTE ESTERASE UR QL STRIP.AUTO: NEGATIVE
LYMPHOCYTES # BLD AUTO: 3.36 10*3/MM3 (ref 0.7–3.1)
LYMPHOCYTES NFR BLD AUTO: 33.1 % (ref 19.6–45.3)
MCH RBC QN AUTO: 33.1 PG (ref 26.6–33)
MCHC RBC AUTO-ENTMCNC: 36.1 G/DL (ref 31.5–35.7)
MCV RBC AUTO: 91.8 FL (ref 79–97)
MONOCYTES # BLD AUTO: 0.45 10*3/MM3 (ref 0.1–0.9)
MONOCYTES NFR BLD AUTO: 4.4 % (ref 5–12)
NEUTROPHILS NFR BLD AUTO: 59.5 % (ref 42.7–76)
NEUTROPHILS NFR BLD AUTO: 6.03 10*3/MM3 (ref 1.7–7)
NITRITE UR QL STRIP: NEGATIVE
NRBC BLD AUTO-RTO: 0 /100 WBC (ref 0–0.2)
NT-PROBNP SERPL-MCNC: 56.1 PG/ML (ref 0–900)
PH UR STRIP.AUTO: <=5 [PH] (ref 5–8)
PLATELET # BLD AUTO: 244 10*3/MM3 (ref 140–450)
PMV BLD AUTO: 11.1 FL (ref 6–12)
POTASSIUM SERPL-SCNC: 4.4 MMOL/L (ref 3.5–5.2)
PROT SERPL-MCNC: 7.2 G/DL (ref 6–8.5)
PROT UR QL STRIP: NEGATIVE
RBC # BLD AUTO: 5.01 10*6/MM3 (ref 4.14–5.8)
SODIUM SERPL-SCNC: 129 MMOL/L (ref 136–145)
SP GR UR STRIP: 1.04 (ref 1–1.03)
TROPONIN T SERPL-MCNC: <0.01 NG/ML (ref 0–0.03)
UROBILINOGEN UR QL STRIP: ABNORMAL
WBC # BLD AUTO: 10.14 10*3/MM3 (ref 3.4–10.8)
WHOLE BLOOD HOLD SPECIMEN: NORMAL
WHOLE BLOOD HOLD SPECIMEN: NORMAL

## 2021-11-15 PROCEDURE — 80053 COMPREHEN METABOLIC PANEL: CPT

## 2021-11-15 PROCEDURE — 81003 URINALYSIS AUTO W/O SCOPE: CPT

## 2021-11-15 PROCEDURE — 84484 ASSAY OF TROPONIN QUANT: CPT

## 2021-11-15 PROCEDURE — 63710000001 INSULIN REGULAR HUMAN PER 5 UNITS: Performed by: NURSE PRACTITIONER

## 2021-11-15 PROCEDURE — 36415 COLL VENOUS BLD VENIPUNCTURE: CPT

## 2021-11-15 PROCEDURE — 82962 GLUCOSE BLOOD TEST: CPT

## 2021-11-15 PROCEDURE — 82010 KETONE BODYS QUAN: CPT | Performed by: NURSE PRACTITIONER

## 2021-11-15 PROCEDURE — 99284 EMERGENCY DEPT VISIT MOD MDM: CPT

## 2021-11-15 PROCEDURE — 85025 COMPLETE CBC W/AUTO DIFF WBC: CPT

## 2021-11-15 PROCEDURE — 93005 ELECTROCARDIOGRAM TRACING: CPT

## 2021-11-15 PROCEDURE — 83880 ASSAY OF NATRIURETIC PEPTIDE: CPT

## 2021-11-15 PROCEDURE — 71045 X-RAY EXAM CHEST 1 VIEW: CPT

## 2021-11-15 RX ORDER — SODIUM CHLORIDE 0.9 % (FLUSH) 0.9 %
10 SYRINGE (ML) INJECTION AS NEEDED
Status: DISCONTINUED | OUTPATIENT
Start: 2021-11-15 | End: 2021-11-16 | Stop reason: HOSPADM

## 2021-11-15 RX ORDER — FLUCONAZOLE 200 MG/1
200 TABLET ORAL DAILY
Qty: 7 TABLET | Refills: 0 | Status: SHIPPED | OUTPATIENT
Start: 2021-11-15 | End: 2021-11-22

## 2021-11-15 RX ORDER — CLOTRIMAZOLE 1 %
1 CREAM (GRAM) TOPICAL 2 TIMES DAILY
Qty: 45 G | Refills: 0 | Status: SHIPPED | OUTPATIENT
Start: 2021-11-15

## 2021-11-15 RX ADMIN — SODIUM CHLORIDE 2000 ML: 9 INJECTION, SOLUTION INTRAVENOUS at 19:29

## 2021-11-15 RX ADMIN — INSULIN HUMAN 6 UNITS: 100 INJECTION, SOLUTION PARENTERAL at 20:20

## 2021-11-15 RX ADMIN — SODIUM CHLORIDE 1000 ML: 9 INJECTION, SOLUTION INTRAVENOUS at 22:45

## 2021-11-15 RX ADMIN — INSULIN HUMAN 6 UNITS: 100 INJECTION, SOLUTION PARENTERAL at 23:47

## 2021-11-15 RX ADMIN — INSULIN HUMAN 6 UNITS: 100 INJECTION, SOLUTION PARENTERAL at 22:44

## 2021-11-16 VITALS
SYSTOLIC BLOOD PRESSURE: 123 MMHG | TEMPERATURE: 97.1 F | WEIGHT: 170 LBS | BODY MASS INDEX: 23.03 KG/M2 | HEART RATE: 57 BPM | OXYGEN SATURATION: 96 % | HEIGHT: 72 IN | DIASTOLIC BLOOD PRESSURE: 72 MMHG | RESPIRATION RATE: 18 BRPM

## 2021-11-16 LAB — GLUCOSE BLDC GLUCOMTR-MCNC: 210 MG/DL (ref 70–130)

## 2021-11-16 PROCEDURE — 82962 GLUCOSE BLOOD TEST: CPT

## 2021-11-16 NOTE — ED PROVIDER NOTES
" EMERGENCY DEPARTMENT ENCOUNTER    Pt Name: Obie Tsyon  MRN: [id number]  Pt :   1965  Room Number:    Date of encounter:  11/15/2021  PCP: Provider, No Known  ED Provider: PAULO Rodriguez    Historian: patient      HPI:  Chief Complaint: generalized weakness        Context: Obie Tyson is a 56 y.o. male who presents to the ED c/o exertional dyspnea and generalized weakness for 3 weeks.  Patient states he has had good appetite but he is ravenously thirsty with dry mouth.  He states he is urinating a lot and has lost a lot of weight.  He has no history of diabetes.  He states he wakes up at nighttime \"covered in sweat that smells like vinegar\".  This has been happening intermittently for 3 to 6 months.    Review of systems is negative for fever or chills.  He has dry mouth and polyuria and polydipsia.  He has subjective weight loss.  Positive for night sweats.  Negative for chest pain but positive for shortness of breath with exertion that relieves with rest.  Patient states he has a chronic smoker's cough.  Nothing is changed in his opinion.  Negative for nausea, vomiting or diarrhea or abdominal pain.  No constipation.  He has positive for profound weakness without dizziness or syncope.    Patient has a past medical history of chronic pancreatitis associated with alcoholism many years ago.  \"I think I had pancreatitis about 18 times\".  He has a history of hypertension and hyperlipidemia.  He is a chronic smoker.  He has no history of coronary artery disease.  He has a history of COPD.  He is not oxygen dependent.  He states he has a \"leaking heart valve\".  Patient states he has a cheesy white discharge around his foreskin that is itchy and burning.  He cleans it off only to have it recur over the last few weeks.  He would prefer to defer examination    PAST MEDICAL HISTORY  Past Medical History:   Diagnosis Date   • Acute pancreatitis     10 times - in 2015;  [facility]; 2016   • " Alcoholism ([facility]) Adulthood    Stopped all alcohol 5/31/11   • Aortic valve insufficiency    • Bursitis of right knee 2016   • Chronic anxiety Adulthood    Much improved on risperidone added to citalopram   • COPD (chronic obstructive pulmonary disease) ([facility])    • Depression Adulthood    2010 started amitriptyline and citalopram for severe dysphoria and anger   • Fracture of ankle 2008    left surgical repair   • GERD (gastroesophageal reflux disease)    • Hyperkalemia    • Hyperlipidemia    • Hypertension    • Low back pain    • Macular degeneration 2017    bilateral   • Migraine    • Perforated ulcer ([facility]) 2013   • Restless leg syndrome    • Substance abuse ([facility]) Adulthood    history of drug and alcohol abuse   • Tendonitis of elbow, right    • Tension headache    • TMJ (dislocation of temporomandibular joint)    • Tobacco use    • Vitamin D deficiency          PAST SURGICAL HISTORY  Past Surgical History:   Procedure Laterality Date   • APPENDECTOMY  1982   • CHOLECYSTECTOMY  2011   • ORIF ANKLE FRACTURE Left 2008   • TEMPOROMANDIBULAR JOINT ARTHROPLASTY     • VENTRAL HERNIA REPAIR  2014         FAMILY HISTORY  Family History   Problem Relation Age of Onset   • Heart attack Mother         stents   • Heart disease Mother         pacemaker   • Macular degeneration Mother    • Arthritis Mother    • Heart disease Father         CHF   • COPD Father    • Heart failure Father    • Asthma Father    • Fibromyalgia Sister    • Cancer Paternal Uncle         oral   • Heart disease Paternal Uncle         stents; CABG; non-smoker   • Bipolar disorder Other    • Depression Other    • Asthma Other    • COPD Other    • Arthritis Brother          SOCIAL HISTORY  Social History     Socioeconomic History   • Marital status:    Tobacco Use   • Smoking status: Current Every Day Smoker     Packs/day: 1.00     Years: 20.00     Pack years: 20.00     Types: Cigarettes     Start date: 1997   • Smokeless tobacco: Never Used   •  Tobacco comment: HIstory of 2 ppd for 20 years. Decreased to 4 cigarettes daily Spring of 2017.    Substance and Sexual Activity   • Alcohol use: No     Comment: Stopped all alcohol 5/31/11   • Drug use: No         ALLERGIES  Iodine, Amitriptyline, Bupropion, and Buspirone        REVIEW OF SYSTEMS  Review of Systems     All systems reviewed and negative except for those discussed in HPI.       PHYSICAL EXAM    I have reviewed the triage vital signs and nursing notes.    ED Triage Vitals [11/15/21 1612]   Temp Heart Rate Resp BP SpO2   97.1 °F (36.2 °C) 90 18 154/76 98 %      Temp src Heart Rate Source Patient Position BP Location FiO2 (%)   Infrared Monitor Sitting Left arm --       Physical Exam  GENERAL:   Appears chronically ill.  He is underweight.  He is an excellent historian.  HENT: Nares patent.  Mouth is dry.  EYES: No scleral icterus.  CV: Regular rhythm, regular rate.  No tachycardia.  No murmur.  RESPIRATORY: Normal effort.  No audible wheezes, rales or rhonchi.  ABDOMEN: Soft, nontender  MUSCULOSKELETAL: No deformities.   NEURO: Alert, moves all extremities, follows commands.  No neurosensory deficits or focal weakness.  SKIN: Warm, dry, no rash visualized.  Genitalia: Deferred        LAB RESULTS  Recent Results (from the past 24 hour(s))   Comprehensive Metabolic Panel    Collection Time: 11/15/21  6:09 PM    Specimen: Blood   Result Value Ref Range    Glucose 435 (C) 65 - 99 mg/dL    BUN 20 6 - 20 mg/dL    Creatinine 1.01 0.76 - 1.27 mg/dL    Sodium 129 (L) 136 - 145 mmol/L    Potassium 4.4 3.5 - 5.2 mmol/L    Chloride 95 (L) 98 - 107 mmol/L    CO2 20.0 (L) 22.0 - 29.0 mmol/L    Calcium 10.5 8.6 - 10.5 mg/dL    Total Protein 7.2 6.0 - 8.5 g/dL    Albumin 4.60 3.50 - 5.20 g/dL    ALT (SGPT) 14 1 - 41 U/L    AST (SGOT) 11 1 - 40 U/L    Alkaline Phosphatase 119 (H) 39 - 117 U/L    Total Bilirubin 0.3 0.0 - 1.2 mg/dL    eGFR Non African Amer 76 >60 mL/min/1.73    Globulin 2.6 gm/dL    A/G Ratio 1.8 g/dL     BUN/Creatinine Ratio 19.8 7.0 - 25.0    Anion Gap 14.0 5.0 - 15.0 mmol/L   BNP    Collection Time: 11/15/21  6:09 PM    Specimen: Blood   Result Value Ref Range    proBNP 56.1 0.0 - 900.0 pg/mL   Troponin    Collection Time: 11/15/21  6:09 PM    Specimen: Blood   Result Value Ref Range    Troponin T <0.010 0.000 - 0.030 ng/mL   Green Top (Gel)    Collection Time: 11/15/21  6:09 PM   Result Value Ref Range    Extra Tube Hold for add-ons.    Lavender Top    Collection Time: 11/15/21  6:09 PM   Result Value Ref Range    Extra Tube hold for add-on    Gold Top - SST    Collection Time: 11/15/21  6:09 PM   Result Value Ref Range    Extra Tube Hold for add-ons.    Gray Top    Collection Time: 11/15/21  6:09 PM   Result Value Ref Range    Extra Tube Hold for add-ons.    Light Blue Top    Collection Time: 11/15/21  6:09 PM   Result Value Ref Range    Extra Tube hold for add-on    CBC Auto Differential    Collection Time: 11/15/21  6:09 PM    Specimen: Blood   Result Value Ref Range    WBC 10.14 3.40 - 10.80 10*3/mm3    RBC 5.01 4.14 - 5.80 10*6/mm3    Hemoglobin 16.6 13.0 - 17.7 g/dL    Hematocrit 46.0 37.5 - 51.0 %    MCV 91.8 79.0 - 97.0 fL    MCH 33.1 (H) 26.6 - 33.0 pg    MCHC 36.1 (H) 31.5 - 35.7 g/dL    RDW 11.1 (L) 12.3 - 15.4 %    RDW-SD 37.4 37.0 - 54.0 fl    MPV 11.1 6.0 - 12.0 fL    Platelets 244 140 - 450 10*3/mm3    Neutrophil % 59.5 42.7 - 76.0 %    Lymphocyte % 33.1 19.6 - 45.3 %    Monocyte % 4.4 (L) 5.0 - 12.0 %    Eosinophil % 1.9 0.3 - 6.2 %    Basophil % 0.8 0.0 - 1.5 %    Immature Grans % 0.3 0.0 - 0.5 %    Neutrophils, Absolute 6.03 1.70 - 7.00 10*3/mm3    Lymphocytes, Absolute 3.36 (H) 0.70 - 3.10 10*3/mm3    Monocytes, Absolute 0.45 0.10 - 0.90 10*3/mm3    Eosinophils, Absolute 0.19 0.00 - 0.40 10*3/mm3    Basophils, Absolute 0.08 0.00 - 0.20 10*3/mm3    Immature Grans, Absolute 0.03 0.00 - 0.05 10*3/mm3    nRBC 0.0 0.0 - 0.2 /100 WBC   Urinalysis With Microscopic If Indicated (No Culture) - Urine,  Clean Catch    Collection Time: 11/15/21  6:09 PM    Specimen: Urine, Clean Catch   Result Value Ref Range    Color, UA Yellow Yellow, Straw    Appearance, UA Clear Clear    pH, UA <=5.0 5.0 - 8.0    Specific Gravity, UA 1.038 (H) 1.001 - 1.030    Glucose, UA >=1000 mg/dL (3+) (A) Negative    Ketones, UA 15 mg/dL (1+) (A) Negative    Bilirubin, UA Negative Negative    Blood, UA Negative Negative    Protein, UA Negative Negative    Leuk Esterase, UA Negative Negative    Nitrite, UA Negative Negative    Urobilinogen, UA 0.2 E.U./dL 0.2 - 1.0 E.U./dL   Beta Hydroxybutyrate Quantitative    Collection Time: 11/15/21  6:09 PM    Specimen: Blood   Result Value Ref Range    Beta-Hydroxybutyrate Quant 1.056 (H) 0.020 - 0.270 mmol/L   POC Glucose Once    Collection Time: 11/15/21 10:06 PM    Specimen: Blood   Result Value Ref Range    Glucose 247 (H) 70 - 130 mg/dL   POC Glucose Once    Collection Time: 11/15/21 11:20 PM    Specimen: Blood   Result Value Ref Range    Glucose 346 (H) 70 - 130 mg/dL   POC Glucose Once    Collection Time: 11/16/21 12:06 AM    Specimen: Blood   Result Value Ref Range    Glucose 210 (H) 70 - 130 mg/dL       If labs were ordered, I independently reviewed the results.        RADIOLOGY  XR Chest 1 View    Result Date: 11/15/2021   EXAMINATION: XR CHEST 1 VW-  INDICATION: SOA triage protocol  COMPARISON: NONE  FINDINGS: Portable chest reveals cardiac and mediastinal silhouettes within normal limits. The lung fields are grossly clear. No focal right opacification present. A pleural effusion or pneumothorax. The bony structures are unremarkable. Pulmonary vascularity is within normal limits.         Cardiopulmonary disease.           PROCEDURES    Procedures    ECG 12 Lead             MEDICATIONS GIVEN IN ER    Medications   sodium chloride 0.9 % flush 10 mL (has no administration in time range)   sodium chloride 0.9 % bolus 2,000 mL (0 mL Intravenous Stopped 11/15/21 2029)   insulin regular (humuLIN  R,novoLIN R) injection 6 Units (6 Units Intravenous Given 11/15/21 2020)   sodium chloride 0.9 % bolus 1,000 mL (0 mL Intravenous Stopped 11/15/21 2350)   insulin regular (humuLIN R,novoLIN R) injection 6 Units (6 Units Subcutaneous Given 11/15/21 2244)   insulin regular (humuLIN R,novoLIN R) injection 6 Units (6 Units Intravenous Given 11/15/21 2347)           ED Course as of 11/16/21 0014   Mon Nov 15, 2021   1905 Glucose(!!): 435 [MS]   1905 Sodium(!): 129 [MS]   1905 Chloride(!): 95 [MS]   2021 Beta-Hydroxybutyrate Quant(!): 1.056 [MS]   2319 I have conferred with Dr. Rivero as well as Dr. Yost.  Patient has a normal anion gap.  We discussed parameters for concern that would warrant return to the emergency department with the patient and his son caregiver.  I have also spoken to his wife who is confident that her primary care provider will work in Mr. Navas this week.  We discussed the importance of low carbohydrate care and initiating Metformin.  Patient and his family understand and concur with his outpatient care with importance of a diabetic education discussed.  In the meantime, Mr. Navas is having itching in his foreskin with white curdy discharge accumulating on a regular basis.  Will treat for Candida balanitis [MS]      ED Course User Index  [MS] Mala Jameson, APRN             AS OF 00:14 EST VITALS:    BP - 123/72  HR - 57  TEMP - 97.1 °F (36.2 °C) (Infrared)  O2 SATS - 96%        DIAGNOSIS  Final diagnoses:   New onset type 2 diabetes mellitus (HCC)   Candidal balanitis         DISPOSITION    DISCHARGE    Patient discharged in stable condition.    Reviewed implications of results, diagnosis, meds, responsibility to follow up, warning signs and symptoms of possible worsening, potential complications and reasons to return to ER.    Patient/Family voiced understanding of above instructions.    Discussed plan for discharge, as there is no emergent indication for admission.  Pt/family is  agreeable and understands need for follow up and possible repeat testing.  Pt/family is aware that discharge does not mean that nothing is wrong but that it indicates no emergency is currently present that requires admission and they must continue care with follow-up as given below or with a physician of their choice.     FOLLOW-UP  PATIENT CONNECTION - [facility] [id number]  [phone]             Medication List      New Prescriptions    clotrimazole 1 % cream  Commonly known as: LOTRIMIN  Apply 1 application topically to the appropriate area as directed 2 (Two) Times a Day.     fluconazole 200 MG tablet  Commonly known as: DIFLUCAN  Take 1 tablet by mouth Daily for 7 days.     metFORMIN 500 MG tablet  Commonly known as: GLUCOPHAGE  Take 1 tablet by mouth 2 (Two) Times a Day With Meals.        Stop    albuterol sulfate  (90 Base) MCG/ACT inhaler  Commonly known as: ProAir HFA     ALPRAZolam 0.5 MG tablet  Commonly known as: XANAX     atorvastatin 80 MG tablet  Commonly known as: LIPITOR     doxazosin 2 MG tablet  Commonly known as: CARDURA     escitalopram 20 MG tablet  Commonly known as: Lexapro     famotidine 20 MG tablet  Commonly known as: PEPCID     gabapentin 600 MG tablet  Commonly known as: NEURONTIN     lisinopril-hydrochlorothiazide 20-12.5 MG per tablet  Commonly known as: PRINZIDE,ZESTORETIC     loratadine 10 MG tablet  Commonly known as: Claritin     risperiDONE 0.25 MG tablet  Commonly known as: RisperDAL     risperiDONE 0.5 MG tablet  Commonly known as: risperDAL     sildenafil 20 MG tablet  Commonly known as: REVATIO     Testosterone Cypionate 200 MG/ML injection  Commonly known as: DEPOTESTOTERONE CYPIONATE     Vitamin D3 50 MCG (2000 UT) tablet           Where to Get Your Medications      These medications were sent to [facility] [address] [place] [address] - [address] [place] [address] - [phone] [facility] [phone] [address] [address]    Phone:  [phone]   · clotrimazole 1 % cream  · fluconazole 200 MG tablet  · metFORMIN 500 MG tablet                  Mala Jameson, APRN  11/16/21 0013       Mala Jameson, APRN  11/16/21 0014

## 2021-11-16 NOTE — DISCHARGE INSTRUCTIONS
Home to rest.  Maintain the very best hydration and nutrition, avoiding carbohydrate and concentrated sugars.  Follow up with a primaryc are provider and establish a primary relationship with referral to diabetic dietary teaching and close surveillance of your blood glucose to prevent both short term and long term complications.  Antifungal medications both oral and topical have been forwarded to your personal pharmacy.  Start taking metformin tomorrow, twice daily.  Return to the ED as needed for worsneing symptoms or concerns.  Thank you and happy Thanksgiving

## 2021-11-20 LAB
QT INTERVAL: 342 MS
QTC INTERVAL: 413 MS